# Patient Record
Sex: MALE | Race: WHITE | NOT HISPANIC OR LATINO | Employment: FULL TIME | URBAN - NONMETROPOLITAN AREA
[De-identification: names, ages, dates, MRNs, and addresses within clinical notes are randomized per-mention and may not be internally consistent; named-entity substitution may affect disease eponyms.]

---

## 2021-12-13 ENCOUNTER — OFFICE VISIT (OUTPATIENT)
Dept: FAMILY MEDICINE | Facility: OTHER | Age: 51
End: 2021-12-13
Attending: FAMILY MEDICINE
Payer: COMMERCIAL

## 2021-12-13 VITALS
DIASTOLIC BLOOD PRESSURE: 86 MMHG | WEIGHT: 183 LBS | HEART RATE: 64 BPM | TEMPERATURE: 96.4 F | RESPIRATION RATE: 20 BRPM | OXYGEN SATURATION: 98 % | SYSTOLIC BLOOD PRESSURE: 138 MMHG | BODY MASS INDEX: 29.41 KG/M2 | HEIGHT: 66 IN

## 2021-12-13 DIAGNOSIS — I10 BENIGN ESSENTIAL HYPERTENSION: Primary | ICD-10-CM

## 2021-12-13 DIAGNOSIS — Z23 NEED FOR PROPHYLACTIC VACCINATION AND INOCULATION AGAINST INFLUENZA: ICD-10-CM

## 2021-12-13 DIAGNOSIS — J45.20 MILD INTERMITTENT ASTHMA WITHOUT COMPLICATION: ICD-10-CM

## 2021-12-13 DIAGNOSIS — F41.9 ANXIETY: ICD-10-CM

## 2021-12-13 DIAGNOSIS — J30.89 NON-SEASONAL ALLERGIC RHINITIS, UNSPECIFIED TRIGGER: ICD-10-CM

## 2021-12-13 LAB
ANION GAP SERPL CALCULATED.3IONS-SCNC: 7 MMOL/L (ref 3–14)
BUN SERPL-MCNC: 16 MG/DL (ref 7–25)
CALCIUM SERPL-MCNC: 9.3 MG/DL (ref 8.6–10.3)
CHLORIDE BLD-SCNC: 106 MMOL/L (ref 98–107)
CO2 SERPL-SCNC: 25 MMOL/L (ref 21–31)
CREAT SERPL-MCNC: 1.12 MG/DL (ref 0.7–1.3)
ERYTHROCYTE [DISTWIDTH] IN BLOOD BY AUTOMATED COUNT: 13.3 % (ref 10–15)
GFR SERPL CREATININE-BSD FRML MDRD: 76 ML/MIN/1.73M2
GLUCOSE BLD-MCNC: 91 MG/DL (ref 70–105)
HCT VFR BLD AUTO: 43.7 % (ref 40–53)
HGB BLD-MCNC: 14.7 G/DL (ref 13.3–17.7)
MCH RBC QN AUTO: 29.9 PG (ref 26.5–33)
MCHC RBC AUTO-ENTMCNC: 33.6 G/DL (ref 31.5–36.5)
MCV RBC AUTO: 89 FL (ref 78–100)
PLATELET # BLD AUTO: 247 10E3/UL (ref 150–450)
POTASSIUM BLD-SCNC: 3.9 MMOL/L (ref 3.5–5.1)
RBC # BLD AUTO: 4.91 10E6/UL (ref 4.4–5.9)
SODIUM SERPL-SCNC: 138 MMOL/L (ref 134–144)
WBC # BLD AUTO: 6.1 10E3/UL (ref 4–11)

## 2021-12-13 PROCEDURE — 90682 RIV4 VACC RECOMBINANT DNA IM: CPT | Performed by: FAMILY MEDICINE

## 2021-12-13 PROCEDURE — 80048 BASIC METABOLIC PNL TOTAL CA: CPT | Mod: ZL | Performed by: FAMILY MEDICINE

## 2021-12-13 PROCEDURE — 90471 IMMUNIZATION ADMIN: CPT | Performed by: FAMILY MEDICINE

## 2021-12-13 PROCEDURE — 36415 COLL VENOUS BLD VENIPUNCTURE: CPT | Mod: ZL | Performed by: FAMILY MEDICINE

## 2021-12-13 PROCEDURE — 99204 OFFICE O/P NEW MOD 45 MIN: CPT | Mod: 25 | Performed by: FAMILY MEDICINE

## 2021-12-13 PROCEDURE — 85027 COMPLETE CBC AUTOMATED: CPT | Mod: ZL | Performed by: FAMILY MEDICINE

## 2021-12-13 RX ORDER — FLUTICASONE PROPIONATE 50 MCG
1 SPRAY, SUSPENSION (ML) NASAL DAILY
Qty: 16 G | Refills: 11 | Status: SHIPPED | OUTPATIENT
Start: 2021-12-13 | End: 2022-11-15

## 2021-12-13 RX ORDER — ALBUTEROL SULFATE 90 UG/1
2 AEROSOL, METERED RESPIRATORY (INHALATION) EVERY 6 HOURS PRN
Qty: 18 G | Refills: 11 | Status: SHIPPED | OUTPATIENT
Start: 2021-12-13 | End: 2022-11-15

## 2021-12-13 RX ORDER — CITALOPRAM HYDROBROMIDE 20 MG/1
20 TABLET ORAL DAILY
Qty: 90 TABLET | Refills: 4 | Status: SHIPPED | OUTPATIENT
Start: 2021-12-13 | End: 2022-11-15

## 2021-12-13 RX ORDER — LISINOPRIL 40 MG/1
40 TABLET ORAL DAILY
Qty: 90 TABLET | Refills: 4 | Status: SHIPPED | OUTPATIENT
Start: 2021-12-13 | End: 2022-11-15

## 2021-12-13 RX ORDER — MONTELUKAST SODIUM 10 MG/1
10 TABLET ORAL AT BEDTIME
Qty: 90 TABLET | Refills: 4 | Status: SHIPPED | OUTPATIENT
Start: 2021-12-13 | End: 2022-11-15

## 2021-12-13 RX ORDER — MELOXICAM 15 MG/1
15 TABLET ORAL DAILY
Qty: 90 TABLET | Refills: 4 | Status: CANCELLED | OUTPATIENT
Start: 2021-12-13

## 2021-12-13 RX ORDER — TRAZODONE HYDROCHLORIDE 50 MG/1
50 TABLET, FILM COATED ORAL AT BEDTIME
Qty: 90 TABLET | Refills: 4 | Status: SHIPPED | OUTPATIENT
Start: 2021-12-13 | End: 2022-11-15

## 2021-12-13 ASSESSMENT — ANXIETY QUESTIONNAIRES
6. BECOMING EASILY ANNOYED OR IRRITABLE: NOT AT ALL
2. NOT BEING ABLE TO STOP OR CONTROL WORRYING: NOT AT ALL
GAD7 TOTAL SCORE: 0
7. FEELING AFRAID AS IF SOMETHING AWFUL MIGHT HAPPEN: NOT AT ALL
5. BEING SO RESTLESS THAT IT IS HARD TO SIT STILL: NOT AT ALL
3. WORRYING TOO MUCH ABOUT DIFFERENT THINGS: NOT AT ALL
1. FEELING NERVOUS, ANXIOUS, OR ON EDGE: NOT AT ALL
IF YOU CHECKED OFF ANY PROBLEMS ON THIS QUESTIONNAIRE, HOW DIFFICULT HAVE THESE PROBLEMS MADE IT FOR YOU TO DO YOUR WORK, TAKE CARE OF THINGS AT HOME, OR GET ALONG WITH OTHER PEOPLE: NOT DIFFICULT AT ALL

## 2021-12-13 ASSESSMENT — PATIENT HEALTH QUESTIONNAIRE - PHQ9
5. POOR APPETITE OR OVEREATING: NOT AT ALL
SUM OF ALL RESPONSES TO PHQ QUESTIONS 1-9: 0

## 2021-12-13 ASSESSMENT — PAIN SCALES - GENERAL: PAINLEVEL: NO PAIN (0)

## 2021-12-13 ASSESSMENT — MIFFLIN-ST. JEOR: SCORE: 1628.86

## 2021-12-13 NOTE — NURSING NOTE
"Patient presents to the clinic for medication management.    FOOD SECURITY SCREENING QUESTIONS:    The next two questions are to help us understand your food security.  If you are feeling you need any assistance in this area, we have resources available to support you today.    Hunger Vital Signs:  Within the past 12 months we worried whether our food would run out before we got money to buy more. Never  Within the past 12 months the food we bought just didn't last and we didn't have money to get more. Never    Advance Care Directive on file? no  Advance Care Directive provided to patient? Declined.    Chief Complaint   Patient presents with     Recheck Medication       Initial BP (!) 148/72 (BP Location: Right arm, Patient Position: Sitting, Cuff Size: Adult Large)   Pulse 64   Temp (!) 96.4  F (35.8  C) (Tympanic)   Resp 20   Ht 1.67 m (5' 5.75\")   Wt 83 kg (183 lb)   SpO2 98%   BMI 29.76 kg/m   Estimated body mass index is 29.76 kg/m  as calculated from the following:    Height as of this encounter: 1.67 m (5' 5.75\").    Weight as of this encounter: 83 kg (183 lb).  Medication Reconciliation: complete        Erika Dickson LPN       "

## 2021-12-13 NOTE — PROGRESS NOTES
"Nursing Notes:   Erika Dickson LPN  12/13/2021  8:46 AM  Sign at exiting of workspace  Patient presents to the clinic for medication management.    FOOD SECURITY SCREENING QUESTIONS:    The next two questions are to help us understand your food security.  If you are feeling you need any assistance in this area, we have resources available to support you today.    Hunger Vital Signs:  Within the past 12 months we worried whether our food would run out before we got money to buy more. Never  Within the past 12 months the food we bought just didn't last and we didn't have money to get more. Never    Advance Care Directive on file? no  Advance Care Directive provided to patient? Declined.    Chief Complaint   Patient presents with     Recheck Medication       Initial BP (!) 148/72 (BP Location: Right arm, Patient Position: Sitting, Cuff Size: Adult Large)   Pulse 64   Temp (!) 96.4  F (35.8  C) (Tympanic)   Resp 20   Ht 1.67 m (5' 5.75\")   Wt 83 kg (183 lb)   SpO2 98%   BMI 29.76 kg/m   Estimated body mass index is 29.76 kg/m  as calculated from the following:    Height as of this encounter: 1.67 m (5' 5.75\").    Weight as of this encounter: 83 kg (183 lb).  Medication Reconciliation: complete        Erika Dickson LPN            Assessment & Plan       ICD-10-CM    1. Benign essential hypertension  I10 lisinopril (ZESTRIL) 40 MG tablet     Basic Metabolic Panel     CBC W PLT No Diff     CBC W PLT No Diff     Basic Metabolic Panel   2. Anxiety  F41.9 citalopram (CELEXA) 20 MG tablet     traZODone (DESYREL) 50 MG tablet   3. Mild intermittent asthma without complication  J45.20 montelukast (SINGULAIR) 10 MG tablet     albuterol (PROAIR HFA/PROVENTIL HFA/VENTOLIN HFA) 108 (90 Base) MCG/ACT inhaler   4. Non-seasonal allergic rhinitis, unspecified trigger  J30.89 montelukast (SINGULAIR) 10 MG tablet     fluticasone (FLONASE) 50 MCG/ACT nasal spray   5. Need for prophylactic vaccination and inoculation against " influenza  Z23 INFLUENZA QUAD, RECOMBINANT, P-FREE (RIV4) (FLUBLOK)     Blood pressure is controlled. BMP and CBC WNL. Refilled lisinopril    Refilled citalopram and as needed trazodone, no changes    Refilled montelukast and albuterol. May need ICS again if asthma worsens back in [place]    Refilled fluticasone for allergies    Influenza vaccine provided.   History of COVID in Oct 2020, 2 vaccinations since, he should be covered. No booster at this time.    Follow up 1 year    Enrique Jaramillo MD     [facility]      Answers for HPI/ROS submitted by the patient on 12/13/2021  Do you have a cough?: No  Are you experiencing any wheezing in your chest?: No  Do you have any shortness of breath?: No  Use of rescue inhaler:: never  Taking Asthma medication as prescribed:: 0  Asthma triggers:: unaware of any triggers  Have you had any Emergency Room visits, Urgent Care visits, or Hospital Admissions since your last office visit?: No    SUBJECTIVE:  51 year old male presents to establish care and follow up on hypertension, asthma, allergies, anxiety.    Lived in [place] for 4 years. Moved back to [place] a few months ago. Works for [employer].    Blood pressure controlled on lisinopril    Montelukast helps asthma and allergies. No albuterol since last year. He's used Flovent in the past, but not for a few years. No significant flares.     On fluticasone regularly for allergies at once daily, twice daily when allergies are worse    Uses trazodone as needed to help with sleep. Used more during the move due to increased stressors. On citalopram for a few years for anxiety. Current dose seems effective.    REVIEW OF SYSTEMS:    Constitutional: negative  Respiratory: negative  Cardiovascular: negative  Gastrointestinal: negative  Musculoskeletal: negative  Neurological: negative    Past Medical History:   Diagnosis Date     Allergic rhinitis 2/14/2013     Anxiety 12/13/2021     Asthma 9/1/2006     Benign essential  "hypertension 12/13/2021     History of COVID-19 10/2020         No current outpatient medications on file.     No Known Allergies    OBJECTIVE:  BP (!) 148/72 (BP Location: Right arm, Patient Position: Sitting, Cuff Size: Adult Large)   Pulse 64   Temp (!) 96.4  F (35.8  C) (Tympanic)   Resp 20   Ht 1.67 m (5' 5.75\")   Wt 83 kg (183 lb)   SpO2 98%   BMI 29.76 kg/m      EXAM:  General Appearance: Alert. No acute distress  Chest/Respiratory Exam: Clear to auscultation bilaterally  Cardiovascular Exam: Regular rate and rhythm. S1, S2, no murmur, gallop, or rubs.  Extremities: 2+ pedal pulses.  No lower extremity edema.  Psychiatric: Normal affect and mentation        Results for orders placed or performed in visit on 12/13/21   CBC W PLT No Diff     Status: Normal   Result Value Ref Range    WBC Count 6.1 4.0 - 11.0 10e3/uL    RBC Count 4.91 4.40 - 5.90 10e6/uL    Hemoglobin 14.7 13.3 - 17.7 g/dL    Hematocrit 43.7 40.0 - 53.0 %    MCV 89 78 - 100 fL    MCH 29.9 26.5 - 33.0 pg    MCHC 33.6 31.5 - 36.5 g/dL    RDW 13.3 10.0 - 15.0 %    Platelet Count 247 150 - 450 10e3/uL   Basic Metabolic Panel     Status: Normal   Result Value Ref Range    Sodium 138 134 - 144 mmol/L    Potassium 3.9 3.5 - 5.1 mmol/L    Chloride 106 98 - 107 mmol/L    Carbon Dioxide (CO2) 25 21 - 31 mmol/L    Anion Gap 7 3 - 14 mmol/L    Urea Nitrogen 16 7 - 25 mg/dL    Creatinine 1.12 0.70 - 1.30 mg/dL    Calcium 9.3 8.6 - 10.3 mg/dL    Glucose 91 70 - 105 mg/dL    GFR Estimate 76 >60 mL/min/1.73m2              "

## 2021-12-13 NOTE — NURSING NOTE
Immunization Documentation    Prior to Immunization administration, verified patients identity using patient's name and date of birth. Please see IMMUNIZATIONS  and order for additional information.  Patient / Parent instructed to remain in clinic for 15 minutes and report any adverse reaction to staff immediately.    Was entire vial of medication used? Yes  Vial/Syringe: Jazmyn Dickson LPN  12/13/2021   9:26 AM

## 2021-12-14 ASSESSMENT — ASTHMA QUESTIONNAIRES: ACT_TOTALSCORE: 25

## 2021-12-14 ASSESSMENT — ANXIETY QUESTIONNAIRES: GAD7 TOTAL SCORE: 0

## 2022-02-06 ENCOUNTER — HEALTH MAINTENANCE LETTER (OUTPATIENT)
Age: 52
End: 2022-02-06

## 2022-04-18 ENCOUNTER — TRANSFERRED RECORDS (OUTPATIENT)
Dept: HEALTH INFORMATION MANAGEMENT | Facility: CLINIC | Age: 52
End: 2022-04-18

## 2022-04-18 LAB — HEMOCCULT STL QL IA: NEGATIVE

## 2022-06-21 ENCOUNTER — E-VISIT (OUTPATIENT)
Dept: URGENT CARE | Facility: CLINIC | Age: 52
End: 2022-06-21
Payer: COMMERCIAL

## 2022-06-21 DIAGNOSIS — R21 RASH AND NONSPECIFIC SKIN ERUPTION: Primary | ICD-10-CM

## 2022-06-21 PROCEDURE — 99207 PR NON-BILLABLE SERV PER CHARTING: CPT | Performed by: EMERGENCY MEDICINE

## 2022-06-21 NOTE — PATIENT INSTRUCTIONS
Dear Scott Villegas,    Please come into urgent care today.  It looks like an infection but needs to be more closely examined.        We are sorry you are not feeling well. Based on the responses you provided, it is recommended that you be seen in-person in urgent care so we can better evaluate your symptoms. Please click here to find the nearest urgent care location to you.   You will not be charged for this Visit. Thank you for trusting us with your care.    Jorge Alexandra MD

## 2022-10-03 ENCOUNTER — MYC MEDICAL ADVICE (OUTPATIENT)
Dept: FAMILY MEDICINE | Facility: OTHER | Age: 52
End: 2022-10-03

## 2022-10-03 ENCOUNTER — HEALTH MAINTENANCE LETTER (OUTPATIENT)
Age: 52
End: 2022-10-03

## 2022-11-15 ENCOUNTER — OFFICE VISIT (OUTPATIENT)
Dept: FAMILY MEDICINE | Facility: OTHER | Age: 52
End: 2022-11-15
Attending: FAMILY MEDICINE
Payer: COMMERCIAL

## 2022-11-15 VITALS
TEMPERATURE: 98 F | SYSTOLIC BLOOD PRESSURE: 126 MMHG | BODY MASS INDEX: 30.82 KG/M2 | HEIGHT: 65 IN | OXYGEN SATURATION: 97 % | HEART RATE: 84 BPM | DIASTOLIC BLOOD PRESSURE: 72 MMHG | RESPIRATION RATE: 16 BRPM | WEIGHT: 185 LBS

## 2022-11-15 DIAGNOSIS — I10 BENIGN ESSENTIAL HYPERTENSION: ICD-10-CM

## 2022-11-15 DIAGNOSIS — Z00.00 ANNUAL PHYSICAL EXAM: Primary | ICD-10-CM

## 2022-11-15 DIAGNOSIS — J30.89 NON-SEASONAL ALLERGIC RHINITIS, UNSPECIFIED TRIGGER: ICD-10-CM

## 2022-11-15 DIAGNOSIS — J45.20 MILD INTERMITTENT ASTHMA WITHOUT COMPLICATION: ICD-10-CM

## 2022-11-15 DIAGNOSIS — F41.9 ANXIETY: ICD-10-CM

## 2022-11-15 DIAGNOSIS — Z23 NEEDS FLU SHOT: ICD-10-CM

## 2022-11-15 LAB
ALBUMIN SERPL BCG-MCNC: 4.3 G/DL (ref 3.5–5.2)
ALP SERPL-CCNC: 96 U/L (ref 40–129)
ALT SERPL W P-5'-P-CCNC: 29 U/L (ref 10–50)
ANION GAP SERPL CALCULATED.3IONS-SCNC: 11 MMOL/L (ref 7–15)
AST SERPL W P-5'-P-CCNC: 25 U/L (ref 10–50)
BILIRUB SERPL-MCNC: 0.2 MG/DL
BUN SERPL-MCNC: 17.6 MG/DL (ref 6–20)
CALCIUM SERPL-MCNC: 9.1 MG/DL (ref 8.6–10)
CHLORIDE SERPL-SCNC: 105 MMOL/L (ref 98–107)
CHOLEST SERPL-MCNC: 200 MG/DL
CREAT SERPL-MCNC: 1.03 MG/DL (ref 0.67–1.17)
DEPRECATED HCO3 PLAS-SCNC: 25 MMOL/L (ref 22–29)
GFR SERPL CREATININE-BSD FRML MDRD: 88 ML/MIN/1.73M2
GLUCOSE SERPL-MCNC: 91 MG/DL (ref 70–99)
HBA1C MFR BLD: 5.3 % (ref 4–6.2)
HDLC SERPL-MCNC: 33 MG/DL
LDLC SERPL CALC-MCNC: ABNORMAL MG/DL
NONHDLC SERPL-MCNC: 167 MG/DL
POTASSIUM SERPL-SCNC: 3.9 MMOL/L (ref 3.4–5.3)
PROT SERPL-MCNC: 7.2 G/DL (ref 6.4–8.3)
SODIUM SERPL-SCNC: 141 MMOL/L (ref 136–145)
TRIGL SERPL-MCNC: 485 MG/DL

## 2022-11-15 PROCEDURE — 80061 LIPID PANEL: CPT | Mod: ZL | Performed by: FAMILY MEDICINE

## 2022-11-15 PROCEDURE — 99396 PREV VISIT EST AGE 40-64: CPT | Mod: 25 | Performed by: FAMILY MEDICINE

## 2022-11-15 PROCEDURE — 36415 COLL VENOUS BLD VENIPUNCTURE: CPT | Mod: ZL | Performed by: FAMILY MEDICINE

## 2022-11-15 PROCEDURE — 90682 RIV4 VACC RECOMBINANT DNA IM: CPT | Performed by: FAMILY MEDICINE

## 2022-11-15 PROCEDURE — 83036 HEMOGLOBIN GLYCOSYLATED A1C: CPT | Mod: ZL | Performed by: FAMILY MEDICINE

## 2022-11-15 PROCEDURE — 80053 COMPREHEN METABOLIC PANEL: CPT | Mod: ZL | Performed by: FAMILY MEDICINE

## 2022-11-15 PROCEDURE — 90471 IMMUNIZATION ADMIN: CPT | Performed by: FAMILY MEDICINE

## 2022-11-15 PROCEDURE — 90677 PCV20 VACCINE IM: CPT | Performed by: FAMILY MEDICINE

## 2022-11-15 PROCEDURE — 90472 IMMUNIZATION ADMIN EACH ADD: CPT | Performed by: FAMILY MEDICINE

## 2022-11-15 RX ORDER — FLUTICASONE PROPIONATE 50 MCG
1 SPRAY, SUSPENSION (ML) NASAL DAILY
Qty: 16 G | Refills: 11 | Status: SHIPPED | OUTPATIENT
Start: 2022-11-15 | End: 2024-01-18

## 2022-11-15 RX ORDER — TRAZODONE HYDROCHLORIDE 50 MG/1
50 TABLET, FILM COATED ORAL AT BEDTIME
Qty: 90 TABLET | Refills: 4 | Status: SHIPPED | OUTPATIENT
Start: 2022-11-15 | End: 2024-01-18

## 2022-11-15 RX ORDER — MONTELUKAST SODIUM 10 MG/1
10 TABLET ORAL AT BEDTIME
Qty: 90 TABLET | Refills: 4 | Status: SHIPPED | OUTPATIENT
Start: 2022-11-15 | End: 2024-01-18

## 2022-11-15 RX ORDER — CITALOPRAM HYDROBROMIDE 20 MG/1
20 TABLET ORAL DAILY
Qty: 90 TABLET | Refills: 4 | Status: SHIPPED | OUTPATIENT
Start: 2022-11-15 | End: 2024-01-18

## 2022-11-15 RX ORDER — LISINOPRIL 40 MG/1
40 TABLET ORAL DAILY
Qty: 90 TABLET | Refills: 4 | Status: SHIPPED | OUTPATIENT
Start: 2022-11-15 | End: 2024-01-18

## 2022-11-15 RX ORDER — ALBUTEROL SULFATE 90 UG/1
2 AEROSOL, METERED RESPIRATORY (INHALATION) EVERY 6 HOURS PRN
Qty: 18 G | Refills: 11 | Status: SHIPPED | OUTPATIENT
Start: 2022-11-15 | End: 2024-01-18

## 2022-11-15 ASSESSMENT — ASTHMA QUESTIONNAIRES
QUESTION_2 LAST FOUR WEEKS HOW OFTEN HAVE YOU HAD SHORTNESS OF BREATH: NOT AT ALL
QUESTION_4 LAST FOUR WEEKS HOW OFTEN HAVE YOU USED YOUR RESCUE INHALER OR NEBULIZER MEDICATION (SUCH AS ALBUTEROL): NOT AT ALL
ACT_TOTALSCORE: 24
QUESTION_1 LAST FOUR WEEKS HOW MUCH OF THE TIME DID YOUR ASTHMA KEEP YOU FROM GETTING AS MUCH DONE AT WORK, SCHOOL OR AT HOME: NONE OF THE TIME
QUESTION_3 LAST FOUR WEEKS HOW OFTEN DID YOUR ASTHMA SYMPTOMS (WHEEZING, COUGHING, SHORTNESS OF BREATH, CHEST TIGHTNESS OR PAIN) WAKE YOU UP AT NIGHT OR EARLIER THAN USUAL IN THE MORNING: NOT AT ALL
QUESTION_5 LAST FOUR WEEKS HOW WOULD YOU RATE YOUR ASTHMA CONTROL: WELL CONTROLLED
ACT_TOTALSCORE: 24

## 2022-11-15 ASSESSMENT — ENCOUNTER SYMPTOMS
DIARRHEA: 0
DIZZINESS: 0
FREQUENCY: 0
HEMATURIA: 0
FEVER: 0
JOINT SWELLING: 0
ABDOMINAL PAIN: 0
WEAKNESS: 0
HEMATOCHEZIA: 0
ARTHRALGIAS: 0
HEARTBURN: 0
CONSTIPATION: 0
COUGH: 0
EYE PAIN: 0
MYALGIAS: 0
NERVOUS/ANXIOUS: 0
CHILLS: 0
PARESTHESIAS: 0
NAUSEA: 0
PALPITATIONS: 0
HEADACHES: 0
SHORTNESS OF BREATH: 0
DYSURIA: 0
SORE THROAT: 0

## 2022-11-15 ASSESSMENT — ANXIETY QUESTIONNAIRES
4. TROUBLE RELAXING: NOT AT ALL
IF YOU CHECKED OFF ANY PROBLEMS ON THIS QUESTIONNAIRE, HOW DIFFICULT HAVE THESE PROBLEMS MADE IT FOR YOU TO DO YOUR WORK, TAKE CARE OF THINGS AT HOME, OR GET ALONG WITH OTHER PEOPLE: NOT DIFFICULT AT ALL
8. IF YOU CHECKED OFF ANY PROBLEMS, HOW DIFFICULT HAVE THESE MADE IT FOR YOU TO DO YOUR WORK, TAKE CARE OF THINGS AT HOME, OR GET ALONG WITH OTHER PEOPLE?: NOT DIFFICULT AT ALL
2. NOT BEING ABLE TO STOP OR CONTROL WORRYING: NOT AT ALL
7. FEELING AFRAID AS IF SOMETHING AWFUL MIGHT HAPPEN: NOT AT ALL
GAD7 TOTAL SCORE: 1
1. FEELING NERVOUS, ANXIOUS, OR ON EDGE: NOT AT ALL
5. BEING SO RESTLESS THAT IT IS HARD TO SIT STILL: NOT AT ALL
3. WORRYING TOO MUCH ABOUT DIFFERENT THINGS: NOT AT ALL
6. BECOMING EASILY ANNOYED OR IRRITABLE: SEVERAL DAYS
7. FEELING AFRAID AS IF SOMETHING AWFUL MIGHT HAPPEN: NOT AT ALL
GAD7 TOTAL SCORE: 1
GAD7 TOTAL SCORE: 1

## 2022-11-15 ASSESSMENT — PATIENT HEALTH QUESTIONNAIRE - PHQ9
SUM OF ALL RESPONSES TO PHQ QUESTIONS 1-9: 0
SUM OF ALL RESPONSES TO PHQ QUESTIONS 1-9: 0

## 2022-11-15 ASSESSMENT — PAIN SCALES - GENERAL: PAINLEVEL: NO PAIN (0)

## 2022-11-15 NOTE — LETTER
My Asthma Action Plan    Name: Scott Villegas   YOB: 1970  Date: 11/15/2022   My doctor: Enrique Jaramillo MD   My clinic: [facility]        My Rescue Medicine:   Albuterol inhaler (Proair/Ventolin/Proventil HFA)  2-4 puffs EVERY 4 HOURS as needed. Use a spacer if recommended by your provider.   My Asthma Severity:   Intermittent / Exercise Induced  Know your asthma triggers: smoke, pollens, humidity and wine             GREEN ZONE   Good Control    I feel good    No cough or wheeze    Can work, sleep and play without asthma symptoms       Take your asthma control medicine every day.     1. If exercise triggers your asthma, take your rescue medication    15 minutes before exercise or sports, and    During exercise if you have asthma symptoms  2. Spacer to use with inhaler: If you have a spacer, make sure to use it with your inhaler             YELLOW ZONE Getting Worse  I have ANY of these:    I do not feel good    Cough or wheeze    Chest feels tight    Wake up at night   1. Keep taking your Green Zone medications  2. Start taking your rescue medicine:    every 20 minutes for up to 1 hour. Then every 4 hours for 24-48 hours.  3. If you stay in the Yellow Zone for more than 12-24 hours, contact your doctor.  4. If you do not return to the Green Zone in 12-24 hours or you get worse, start taking your oral steroid medicine if prescribed by your provider.           RED ZONE Medical Alert - Get Help  I have ANY of these:    I feel awful    Medicine is not helping    Breathing getting harder    Trouble walking or talking    Nose opens wide to breathe       1. Take your rescue medicine NOW  2. If your provider has prescribed an oral steroid medicine, start taking it NOW  3. Call your doctor NOW  4. If you are still in the Red Zone after 20 minutes and you have not reached your doctor:    Take your rescue medicine again and    Call 911 or go to the emergency room right away    See your  regular doctor within 2 weeks of an Emergency Room or Urgent Care visit for follow-up treatment.          Annual Reminders:  Meet with Asthma Educator,  Flu Shot in the Fall, consider Pneumonia Vaccination for patients with asthma (aged 19 and older).    Pharmacy: [id number] ([place]) - [place] [id number]    Electronically signed by Enrique Jaramillo MD   Date: 11/15/22                    Asthma Triggers  How To Control Things That Make Your Asthma Worse    Triggers are things that make your asthma worse.  Look at the list below to help you find your triggers and   what you can do about them. You can help prevent asthma flare-ups by staying away from your triggers.      Trigger                                                          What you can do   Cigarette Smoke  Tobacco smoke can make asthma worse. Do not allow smoking in your home, car or around you.  Be sure no one smokes at a child s day care or school.  If you smoke, ask your health care provider for ways to help you quit.  Ask family members to quit too.  Ask your health care provider for a referral to Quit Plan to help you quit smoking, or call 7-138-182-PLAN.     Colds, Flu, Bronchitis  These are common triggers of asthma. Wash your hands often.  Don t touch your eyes, nose or mouth.  Get a flu shot every year.     Dust Mites  These are tiny bugs that live in cloth or carpet. They are too small to see. Wash sheets and blankets in hot water every week.   Encase pillows and mattress in dust mite proof covers.  Avoid having carpet if you can. If you have carpet, vacuum weekly.   Use a dust mask and HEPA vacuum.   Pollen and Outdoor Mold  Some people are allergic to trees, grass, or weed pollen, or molds. Try to keep your windows closed.  Limit time out doors when pollen count is high.   Ask you health care provider about taking medicine during allergy season.     Animal Dander  Some people are allergic to skin flakes,  urine or saliva from pets with fur or feathers. Keep pets with fur or feathers out of your home.    If you can t keep the pet outdoors, then keep the pet out of your bedroom.  Keep the bedroom door closed.  Keep pets off cloth furniture and away from stuffed toys.     Mice, Rats, and Cockroaches  Some people are allergic to the waste from these pests.   Cover food and garbage.  Clean up spills and food crumbs.  Store grease in the refrigerator.   Keep food out of the bedroom.   Indoor Mold  This can be a trigger if your home has high moisture. Fix leaking faucets, pipes, or other sources of water.   Clean moldy surfaces.  Dehumidify basement if it is damp and smelly.   Smoke, Strong Odors, and Sprays  These can reduce air quality. Stay away from strong odors and sprays, such as perfume, powder, hair spray, paints, smoke incense, paint, cleaning products, candles and new carpet.   Exercise or Sports  Some people with asthma have this trigger. Be active!  Ask your doctor about taking medicine before sports or exercise to prevent symptoms.    Warm up for 5-10 minutes before and after sports or exercise.     Other Triggers of Asthma  Cold air:  Cover your nose and mouth with a scarf.  Sometimes laughing or crying can be a trigger.  Some medicines and food can trigger asthma.

## 2022-11-15 NOTE — PATIENT INSTRUCTIONS
Pneumonia and flu vaccines  Shingles from the shot nurse  Consider COVID vaccine to reduce disease severity

## 2022-11-15 NOTE — NURSING NOTE
"Patient presents to the clinic for physical.    FOOD SECURITY SCREENING QUESTIONS:    The next two questions are to help us understand your food security.  If you are feeling you need any assistance in this area, we have resources available to support you today.    Hunger Vital Signs:  Within the past 12 months we worried whether our food would run out before we got money to buy more. Never  Within the past 12 months the food we bought just didn't last and we didn't have money to get more. Never    Advance Care Directive on file? no  Advance Care Directive provided to patient? Declined.    Chief Complaint   Patient presents with     Physical       Initial /72 (BP Location: Right arm, Patient Position: Sitting, Cuff Size: Adult Large)   Pulse 84   Temp 98  F (36.7  C) (Tympanic)   Resp 16   Ht 1.657 m (5' 5.25\")   Wt 83.9 kg (185 lb)   SpO2 97%   BMI 30.55 kg/m   Estimated body mass index is 30.55 kg/m  as calculated from the following:    Height as of this encounter: 1.657 m (5' 5.25\").    Weight as of this encounter: 83.9 kg (185 lb).  Medication Reconciliation: complete        Erika Dickson LPN       "

## 2022-11-15 NOTE — PROGRESS NOTES
3  SUBJECTIVE:   CC: Scott Villegas is an 52 year old male who presents for preventive health visit.       Patient has been advised of split billing requirements and indicates understanding: Yes  Healthy Habits:    Answers for HPI/ROS submitted by the patient on 11/15/2022  PHQ9 TOTAL SCORE: 0  DOUGLAS 7 TOTAL SCORE: 1  Frequency of exercise:: 2-3 days/week  Getting at least 3 servings of Calcium per day:: NO  Diet:: Regular (no restrictions)  Taking medications regularly:: Yes  Medication side effects:: None  Bi-annual eye exam:: NO  Dental care twice a year:: NO  Sleep apnea or symptoms of sleep apnea:: None  Additional concerns today:: No  Duration of exercise:: 15-30 minutes            Today's PHQ-2 Score:   PHQ-2 ( 1999 [employer]) 11/15/2022 12/13/2021   Q1: Little interest or pleasure in doing things 0 0   Q2: Feeling down, depressed or hopeless 0 0   PHQ-2 Score 0 0   Q1: Little interest or pleasure in doing things Not at all Not at all   Q2: Feeling down, depressed or hopeless Not at all Not at all   PHQ-2 Score 0 0       Abuse: Current or Past(Physical, Sexual or Emotional)- NA  Do you feel safe in your environment? Yes    Have you ever done Advance Care Planning? (For example, a Health Directive, POLST, or a discussion with a medical provider or your loved ones about your wishes): No, advance care planning information given to patient to review.  Advanced care planning was discussed at today's visit.    Social History     Tobacco Use     Smoking status: Never     Smokeless tobacco: Never   Substance Use Topics     Alcohol use: Yes     Comment: couple times per week     If you drink alcohol do you typically have >3 drinks per day or >7 drinks per week? No                      Last PSA: No results found for: PSA    Reviewed orders with patient. Reviewed health maintenance and updated orders accordingly - Yes  Patient Active Problem List   Diagnosis     Allergic rhinitis     Asthma     Brachial neuritis or  radiculitis     Contact dermatitis and other eczema due to other chemical products     Ganglion of joint     Neuralgia, neuritis, and radiculitis, unspecified     Olecranon bursitis     Stomatitis and mucositis     Thoracic or lumbosacral neuritis or radiculitis, unspecified     Anxiety     Benign essential hypertension     Past Surgical History:   Procedure Laterality Date     FECAL COLORECTAL CANCER SCREEN (FIT) (EXTERNAL RESULT)  04/18/2022    Negative       Social History     Tobacco Use     Smoking status: Never     Smokeless tobacco: Never   Substance Use Topics     Alcohol use: Yes     Comment: couple times per week     Family History   Problem Relation Age of Onset     Heart Defect Mother      Diabetes Father      Prostate Cancer No family hx of      Colon Cancer No family hx of          Current Outpatient Medications   Medication Sig Dispense Refill     albuterol (PROAIR HFA/PROVENTIL HFA/VENTOLIN HFA) 108 (90 Base) MCG/ACT inhaler Inhale 2 puffs into the lungs every 6 hours as needed for shortness of breath / dyspnea or wheezing 18 g 11     citalopram (CELEXA) 20 MG tablet Take 1 tablet (20 mg) by mouth daily 90 tablet 4     fluticasone (FLONASE) 50 MCG/ACT nasal spray Spray 1 spray into both nostrils daily 16 g 11     lisinopril (ZESTRIL) 40 MG tablet Take 1 tablet (40 mg) by mouth daily 90 tablet 4     montelukast (SINGULAIR) 10 MG tablet Take 1 tablet (10 mg) by mouth At Bedtime 90 tablet 4     traZODone (DESYREL) 50 MG tablet Take 1 tablet (50 mg) by mouth At Bedtime 90 tablet 4     No Known Allergies    Reviewed and updated as needed this visit by clinical staff   Tobacco  Allergies  Meds  Problems  Med Hx  Surg Hx  Fam Hx  Soc   Hx        Reviewed and updated as needed this visit by Provider   Tobacco  Allergies  Meds  Problems  Med Hx  Surg Hx  Fam Hx             ROS:  abdominal pain: No  Blood in stool: No  Blood in urine: No  chest pain: No  chills: No  congestion: No  constipation:  "No  cough: No  diarrhea: No  dizziness: No  ear pain: No  eye pain: No  nervous/anxious: No  fever: No  frequency: No  genital sores: No  headaches: No  hearing loss: No  heartburn: No  arthralgias: No  joint swelling: No  peripheral edema: No  mood changes: No  myalgias: No  nausea: No  dysuria: No  palpitations: No  Skin sensation changes: No  sore throat: No  urgency: No  rash: No  shortness of breath: No  visual disturbance: No  weakness: No  penile discharge: No      OBJECTIVE:   /72 (BP Location: Right arm, Patient Position: Sitting, Cuff Size: Adult Large)   Pulse 84   Temp 98  F (36.7  C) (Tympanic)   Resp 16   Ht 1.657 m (5' 5.25\")   Wt 83.9 kg (185 lb)   SpO2 97%   BMI 30.55 kg/m    EXAM:  General Appearance: Pleasant, alert, appropriate appearance for age. No acute distress  Eye Exam:  Normal external eye, conjunctiva, lids, cornea. RENE.  Ear Exam: Normal TM's bilaterally. Normal auditory canals and external ears.  OroPharynx Exam:  Dental hygiene adequate. Normal buccal mucosa. Normal pharynx.  Neck Exam:  Supple, no masses or nodes.  Thyroid Exam: No nodules or enlargement.  Chest/Respiratory Exam: Normal chest wall and respirations. Clear to auscultation.  Cardiovascular Exam: Regular rate and rhythm. S1, S2, no murmur, click, gallop, or rubs.  Gastrointestinal Exam: Soft, non-tender, no masses or organomegaly.  Musculoskeletal Exam: Back is straight and non-tender, full ROM of upper and lower extremities.  Foot Exam: Left and right foot: good pedal pulses.  Skin: no rash or abnormalities  Neurologic Exam: Nonfocal, symmetric DTRs, normal gross motor, tone coordination and no tremor.  Psychiatric Exam: Alert and oriented - appropriate affect.      Results for orders placed or performed in visit on 11/15/22   Hemoglobin A1c     Status: Normal   Result Value Ref Range    Hemoglobin A1C 5.3 4.0 - 6.2 %   Lipid Panel     Status: Abnormal   Result Value Ref Range    Cholesterol 200 (H) <200 " mg/dL    Triglycerides 485 (H) <150 mg/dL    Direct Measure HDL 33 (L) >=40 mg/dL    LDL Cholesterol Calculated      Non HDL Cholesterol 167 (H) <130 mg/dL    Narrative    Cholesterol  Desirable:  <200 mg/dL    Triglycerides  Normal:  Less than 150 mg/dL  Borderline High:  150-199 mg/dL  High:  200-499 mg/dL  Very High:  Greater than or equal to 500 mg/dL    Direct Measure HDL  Female:  Greater than or equal to 50 mg/dL   Male:  Greater than or equal to 40 mg/dL    LDL Cholesterol  Desirable:  <100mg/dL  Above Desirable:  100-129 mg/dL   Borderline High:  130-159 mg/dL   High:  160-189 mg/dL   Very High:  >= 190 mg/dL    Non HDL Cholesterol  Desirable:  130 mg/dL  Above Desirable:  130-159 mg/dL  Borderline High:  160-189 mg/dL  High:  190-219 mg/dL  Very High:  Greater than or equal to 220 mg/dL   Comprehensive Metabolic Panel     Status: Normal   Result Value Ref Range    Sodium 141 136 - 145 mmol/L    Potassium 3.9 3.4 - 5.3 mmol/L    Chloride 105 98 - 107 mmol/L    Carbon Dioxide (CO2) 25 22 - 29 mmol/L    Anion Gap 11 7 - 15 mmol/L    Urea Nitrogen 17.6 6.0 - 20.0 mg/dL    Creatinine 1.03 0.67 - 1.17 mg/dL    Calcium 9.1 8.6 - 10.0 mg/dL    Glucose 91 70 - 99 mg/dL    Alkaline Phosphatase 96 40 - 129 U/L    AST 25 10 - 50 U/L    ALT 29 10 - 50 U/L    Protein Total 7.2 6.4 - 8.3 g/dL    Albumin 4.3 3.5 - 5.2 g/dL    Bilirubin Total 0.2 <=1.2 mg/dL    GFR Estimate 88 >60 mL/min/1.73m2        ASSESSMENT/PLAN:       ICD-10-CM    1. Annual physical exam  Z00.00 INFLUENZA QUAD, RECOMBINANT, P-FREE (RIV4) (FLUBLOK)     PNEUMOCOCCAL 20 VALENT CONJUGATE (PREVNAR 20)     Lipid Panel     Hemoglobin A1c     Hemoglobin A1c     Lipid Panel      2. Benign essential hypertension  I10 lisinopril (ZESTRIL) 40 MG tablet     Comprehensive Metabolic Panel     Comprehensive Metabolic Panel      3. Anxiety  F41.9 traZODone (DESYREL) 50 MG tablet     citalopram (CELEXA) 20 MG tablet      4. Mild intermittent asthma without  complication  J45.20 montelukast (SINGULAIR) 10 MG tablet     albuterol (PROAIR HFA/PROVENTIL HFA/VENTOLIN HFA) 108 (90 Base) MCG/ACT inhaler      5. Non-seasonal allergic rhinitis, unspecified trigger  J30.89 montelukast (SINGULAIR) 10 MG tablet     fluticasone (FLONASE) 50 MCG/ACT nasal spray      6. Needs flu shot  Z23             Patient has been advised of split billing requirements and indicates understanding: Yes     Blood pressure controlled.  BMP normal.  Refilled lisinopril    Anxiety controlled with current citalopram dose.  Using trazodone to help with sleep.  Discussed trying to wean off trazodone as there is no proven long-term benefit for insomnia treatment.    Asthma and allergies controlled with montelukast.  Allergic rhinitis controlled with fluticasone.  Rarely using albuterol, mainly in the spring with worsened allergies.  Refilled medications      COUNSELING:  Reviewed preventive health counseling, as reflected in patient instructions       Regular exercise       Healthy diet/nutrition       Vision screening       Hearing screening       Immunizations    Vaccinated for: Influenza     Pneumococcal vaccination provided due to asthma    Shingrix from shot nurse if approved by insurance         Colorectal cancer screening - completed FIT testing through insurance        Statin not indicated.  HDL is lower than last year.  Triglycerides and non-HDL cholesterol are higher.  Encouraged weight loss and regular exercise.  Recheck next year       The 10-year ASCVD risk score (Martha JONES, et al., 2019) is: 6.6%    Values used to calculate the score:      Age: 52 years      Sex: Male      Is Non- : No      Diabetic: No      Tobacco smoker: No      Systolic Blood Pressure: 126 mmHg      Is BP treated: Yes      HDL Cholesterol: 33 mg/dL      Total Cholesterol: 200 mg/dL    Estimated body mass index is 30.55 kg/m  as calculated from the following:    Height as of this encounter: 1.657 m  "(5' 5.25\").    Weight as of this encounter: 83.9 kg (185 lb).    Weight management plan: Discussed healthy diet and exercise guidelines    He reports that he has never smoked. He has never used smokeless tobacco.      Counseling Resources:  ATP IV Guidelines  Pooled Cohorts Equation Calculator  FRAX Risk Assessment  ICSI Preventive Guidelines  Dietary Guidelines for Americans, 2010  USDA's MyPlate  ASA Prophylaxis  Lung CA Screening    Enrique Jaramillo MD  [facility]  "

## 2023-12-31 ENCOUNTER — HEALTH MAINTENANCE LETTER (OUTPATIENT)
Age: 53
End: 2023-12-31

## 2024-01-13 ENCOUNTER — MYC REFILL (OUTPATIENT)
Dept: FAMILY MEDICINE | Facility: OTHER | Age: 54
End: 2024-01-13
Payer: COMMERCIAL

## 2024-01-13 DIAGNOSIS — J30.89 NON-SEASONAL ALLERGIC RHINITIS, UNSPECIFIED TRIGGER: ICD-10-CM

## 2024-01-13 DIAGNOSIS — I10 BENIGN ESSENTIAL HYPERTENSION: ICD-10-CM

## 2024-01-13 DIAGNOSIS — F41.9 ANXIETY: ICD-10-CM

## 2024-01-13 DIAGNOSIS — J45.20 MILD INTERMITTENT ASTHMA WITHOUT COMPLICATION: ICD-10-CM

## 2024-01-15 NOTE — TELEPHONE ENCOUNTER
Requested Prescriptions   Pending Prescriptions Disp Refills    traZODone (DESYREL) 50 MG tablet 90 tablet 4     Sig: Take 1 tablet (50 mg) by mouth at bedtime   Last Prescription Date:   11/15/22  Last Fill Qty/Refills:         90, R-4      Serotonin Modulators Failed - 1/15/2024  3:07 PM        Failed - Recent (12 mo) or future (30 days) visit within the authorizing provider's specialty       montelukast (SINGULAIR) 10 MG tablet 90 tablet 4     Sig: Take 1 tablet (10 mg) by mouth at bedtime   Last Prescription Date:   11/15/22  Last Fill Qty/Refills:         90, R-4      Leukotriene Inhibitors Protocol Failed - 1/15/2024  3:07 PM        Failed - Asthma control assessment score within normal limits in last 6 months     Please review ACT score.         Failed - Recent (6 mo) or future (30 days) visit within the authorizing provider's specialty       lisinopril (ZESTRIL) 40 MG tablet 90 tablet 4     Sig: Take 1 tablet (40 mg) by mouth daily   Last Prescription Date:   11/15/22  Last Fill Qty/Refills:         90, R-4      ACE Inhibitors (Including Combos) Protocol Failed - 1/15/2024  3:07 PM        Failed - Blood pressure under 140/90 in past 12 months     BP Readings from Last 3 Encounters:   11/15/22 126/72   12/13/21 138/86           Failed - Recent (12 mo) or future (30 days) visit within the authorizing provider's specialty        Failed - Normal serum creatinine on file in past 12 months     Recent Labs   Lab Test 11/15/22  1643   CR 1.03   Ok to refill medication if creatinine is low        Failed - Normal serum potassium on file in past 12 months     Recent Labs   Lab Test 11/15/22  1643   POTASSIUM 3.9          fluticasone (FLONASE) 50 MCG/ACT nasal spray 16 g 11     Sig: Spray 1 spray into both nostrils daily   Last Prescription Date:   11/15/22  Last Fill Qty/Refills:         16 g, R-11      Nasal Allergy Protocol Failed - 1/15/2024  3:07 PM        Failed - Recent (12 mo) or future (30 days) visit within  the authorizing provider's specialty       citalopram (CELEXA) 20 MG tablet 90 tablet 4     Sig: Take 1 tablet (20 mg) by mouth daily       SSRIs Protocol Failed - 1/15/2024  3:08 PM        Failed - Recent (12 mo) or future (30 days) visit within the authorizing provider's specialty       albuterol (PROAIR HFA/PROVENTIL HFA/VENTOLIN HFA) 108 (90 Base) MCG/ACT inhaler 18 g 11     Sig: Inhale 2 puffs into the lungs every 6 hours as needed for shortness of breath or wheezing   Last Prescription Date:   11/15/22  Last Fill Qty/Refills:         18 g, R-11      Asthma Maintenance Inhalers - Anticholinergics Failed - 1/15/2024  3:08 PM    Short-Acting Beta Agonist Inhalers Protocol  Failed - 1/15/2024  3:08 PM        Failed - Asthma control assessment score within normal limits in last 6 months     Please review ACT score.         Failed - Recent (6 mo) or future (30 days) visit within the authorizing provider's specialty     Last Office Visit:              11/15/22 ([facility])   Future Office visit:           None    Pt due for annual exam/establish care appointment in the absence of Dr. Jaramillo. Routing to provider for refill consideration. Routing to Unit scheduling pool, to assist Pt in scheduling appointment.     Unable to complete prescription refill per RN Medication Refill Policy.     Magaly Freedman RN .............. 1/15/2024  3:18 PM

## 2024-01-16 ENCOUNTER — MYC MEDICAL ADVICE (OUTPATIENT)
Dept: FAMILY MEDICINE | Facility: OTHER | Age: 54
End: 2024-01-16
Payer: COMMERCIAL

## 2024-01-18 RX ORDER — LISINOPRIL 40 MG/1
40 TABLET ORAL DAILY
Qty: 90 TABLET | Refills: 0 | OUTPATIENT
Start: 2024-01-18

## 2024-01-18 RX ORDER — FLUTICASONE PROPIONATE 50 MCG
1 SPRAY, SUSPENSION (ML) NASAL DAILY
Qty: 16 G | Refills: 2 | OUTPATIENT
Start: 2024-01-18

## 2024-01-18 RX ORDER — CITALOPRAM HYDROBROMIDE 20 MG/1
20 TABLET ORAL DAILY
Qty: 90 TABLET | Refills: 4 | Status: SHIPPED | OUTPATIENT
Start: 2024-01-18 | End: 2024-03-14

## 2024-01-18 RX ORDER — MONTELUKAST SODIUM 10 MG/1
10 TABLET ORAL AT BEDTIME
Qty: 90 TABLET | Refills: 0 | OUTPATIENT
Start: 2024-01-18

## 2024-01-18 RX ORDER — TRAZODONE HYDROCHLORIDE 50 MG/1
50 TABLET, FILM COATED ORAL AT BEDTIME
Qty: 90 TABLET | Refills: 4 | Status: SHIPPED | OUTPATIENT
Start: 2024-01-18 | End: 2024-03-14

## 2024-01-18 RX ORDER — ALBUTEROL SULFATE 90 UG/1
2 AEROSOL, METERED RESPIRATORY (INHALATION) EVERY 6 HOURS PRN
Qty: 18 G | Refills: 11 | Status: SHIPPED | OUTPATIENT
Start: 2024-01-18 | End: 2024-02-15

## 2024-01-18 RX ORDER — TRAZODONE HYDROCHLORIDE 50 MG/1
50 TABLET, FILM COATED ORAL AT BEDTIME
Qty: 90 TABLET | Refills: 0 | OUTPATIENT
Start: 2024-01-18

## 2024-01-18 RX ORDER — ALBUTEROL SULFATE 90 UG/1
2 AEROSOL, METERED RESPIRATORY (INHALATION) EVERY 6 HOURS PRN
Qty: 18 G | Refills: 2 | OUTPATIENT
Start: 2024-01-18

## 2024-01-18 RX ORDER — FLUTICASONE PROPIONATE 50 MCG
1 SPRAY, SUSPENSION (ML) NASAL DAILY
Qty: 16 G | Refills: 11 | Status: SHIPPED | OUTPATIENT
Start: 2024-01-18 | End: 2024-03-14

## 2024-01-18 RX ORDER — LISINOPRIL 40 MG/1
40 TABLET ORAL DAILY
Qty: 90 TABLET | Refills: 4 | Status: SHIPPED | OUTPATIENT
Start: 2024-01-18 | End: 2024-03-14

## 2024-01-18 RX ORDER — MONTELUKAST SODIUM 10 MG/1
10 TABLET ORAL AT BEDTIME
Qty: 90 TABLET | Refills: 4 | Status: SHIPPED | OUTPATIENT
Start: 2024-01-18 | End: 2024-03-14

## 2024-01-18 RX ORDER — CITALOPRAM HYDROBROMIDE 20 MG/1
20 TABLET ORAL DAILY
Qty: 90 TABLET | Refills: 0 | OUTPATIENT
Start: 2024-01-18

## 2024-01-18 NOTE — TELEPHONE ENCOUNTER
No.  I've never seen him, nor have we managed his meds here.    Signed, Arun Doe MD, FAAP, FACP  Internal Medicine & Pediatrics

## 2024-01-30 DIAGNOSIS — F41.9 ANXIETY: ICD-10-CM

## 2024-01-31 RX ORDER — CITALOPRAM HYDROBROMIDE 20 MG/1
20 TABLET ORAL DAILY
Qty: 90 TABLET | Refills: 2 | OUTPATIENT
Start: 2024-01-31

## 2024-01-31 NOTE — TELEPHONE ENCOUNTER
Duplicate request received from [facility]. Rx sent to [facility] pharmacy per pt request on 1/18/24 with 90 tab, and 4 refills. Refused refill at this time.     Serene Lott RN on 1/31/2024 at 3:43 PM

## 2024-02-04 ENCOUNTER — MYC REFILL (OUTPATIENT)
Dept: FAMILY MEDICINE | Facility: OTHER | Age: 54
End: 2024-02-04
Payer: COMMERCIAL

## 2024-02-04 DIAGNOSIS — J45.20 MILD INTERMITTENT ASTHMA WITHOUT COMPLICATION: ICD-10-CM

## 2024-02-04 RX ORDER — ALBUTEROL SULFATE 90 UG/1
2 AEROSOL, METERED RESPIRATORY (INHALATION) EVERY 6 HOURS PRN
Qty: 18 G | Refills: 11 | Status: CANCELLED | OUTPATIENT
Start: 2024-02-04

## 2024-02-15 ENCOUNTER — MYC REFILL (OUTPATIENT)
Dept: FAMILY MEDICINE | Facility: OTHER | Age: 54
End: 2024-02-15
Payer: COMMERCIAL

## 2024-02-15 DIAGNOSIS — J45.20 MILD INTERMITTENT ASTHMA WITHOUT COMPLICATION: ICD-10-CM

## 2024-02-19 NOTE — TELEPHONE ENCOUNTER
"  Last Prescription Date: 1/18/24  Last Qty/Refills: 18 g / 11  Last Office Visit: 11/15/22 [facility]  Future Office Visit: 3/14/24 Doe     Requested Prescriptions   Pending Prescriptions Disp Refills    albuterol (PROAIR HFA/PROVENTIL HFA/VENTOLIN HFA) 108 (90 Base) MCG/ACT inhaler 18 g 11     Sig: Inhale 2 puffs into the lungs every 6 hours as needed for shortness of breath or wheezing       Asthma Maintenance Inhalers - Anticholinergics Failed - 2/15/2024  6:31 AM        Failed - Asthma control assessment score within normal limits in last 6 months     Please review ACT score.           Failed - Recent (6 mo) or future (30 days) visit within the authorizing provider's specialty     Patient had office visit in the last 6 months or has a visit in the next 30 days with authorizing provider or within the authorizing provider's specialty.  See \"Patient Info\" tab in inbasket, or \"Choose Columns\" in Meds & Orders section of the refill encounter.            Passed - Patient is age 12 years or older        Passed - Medication is active on med list       Short-Acting Beta Agonist Inhalers Protocol  Failed - 2/15/2024  6:31 AM        Failed - Asthma control assessment score within normal limits in last 6 months     Please review ACT score.           Failed - Recent (6 mo) or future (30 days) visit within the authorizing provider's specialty     Patient had office visit in the last 6 months or has a visit in the next 30 days with authorizing provider or within the authorizing provider's specialty.  See \"Patient Info\" tab in inbasket, or \"Choose Columns\" in Meds & Orders section of the refill encounter.            Passed - Patient is age 12 or older        Passed - Medication is active on med list             "

## 2024-02-20 RX ORDER — ALBUTEROL SULFATE 90 UG/1
2 AEROSOL, METERED RESPIRATORY (INHALATION) EVERY 6 HOURS PRN
Qty: 18 G | Refills: 0 | Status: SHIPPED | OUTPATIENT
Start: 2024-02-20 | End: 2024-02-23

## 2024-02-23 ENCOUNTER — MYC MEDICAL ADVICE (OUTPATIENT)
Dept: PEDIATRICS | Facility: OTHER | Age: 54
End: 2024-02-23
Payer: COMMERCIAL

## 2024-02-23 DIAGNOSIS — J45.20 MILD INTERMITTENT ASTHMA WITHOUT COMPLICATION: ICD-10-CM

## 2024-02-23 RX ORDER — ALBUTEROL SULFATE 90 UG/1
2 AEROSOL, METERED RESPIRATORY (INHALATION) EVERY 6 HOURS PRN
Qty: 13.4 G | Refills: 0 | Status: SHIPPED | OUTPATIENT
Start: 2024-02-20 | End: 2024-03-14

## 2024-02-23 NOTE — TELEPHONE ENCOUNTER
Pt CRM request turned [facility] Message:    Topic: Tell us how we are doing.     Hello,     I need somebody to send a formulary exception form in to my insurance so they will cover my albuteral inhaler for asthma.     They have denied coverage until this form is sent in to them.     Please advise.     Thank you!     Scott Villegas  [phone]      My Response to Patient:    Hi Scott,    I'm so sorry we are just getting to this.  This was sent to a pool that does not get automatically routed to the nurses and providers.  Has this been taken care of yet?     Let me know,    Whitney RN     Response back from patient:    I'm not sure if it has been or not! I do know it has not yet been approved . I'm in-between providers I was with vasquez. Waiting to get in with Nader. Another Dr sent in prescription refills earlier which was great but now this denial came back for albuteral.

## 2024-02-23 NOTE — TELEPHONE ENCOUNTER
Found Albuterol Denial letter scanned in to chart from 2/14/2024.    Called [facility] Review at [phone].    Member ID: [id number]      Quantity dispensed must be a multiple of 6.7.  She ran through a trial of 13.4 and it went through.  She told me to adjust the quantity dispensed and include the following NDC number.     NDC #: [id number]    [facility]    Whitney Quezada RN on 2/23/2024 at 3:24 PM

## 2024-02-29 NOTE — TELEPHONE ENCOUNTER
Left message at [employer], to return call to [phone]. Magaly Freedman RN .............. 2/29/2024  2:34 PM

## 2024-02-29 NOTE — TELEPHONE ENCOUNTER
Patient comment: [employer] contacted me and said they sent you a notice that sunita type if pre authorization form has been requested for this medication.     Last Prescription Date:     albuterol (PROAIR HFA/PROVENTIL HFA/VENTOLIN HFA) 108 (90 Base) MCG/ACT inhaler 13.4 g 0 2/20/2024 -- No   Sig - Route: Inhale 2 puffs into the lungs every 6 hours as needed for shortness of breath or wheezing - Inhalation     [facility] - [place], [place] - [address]     Attempted reaching [employer] to see what is needed. Waited on hold >13 minutes. Will call again later today. Magaly Freedman RN .............. 2/29/2024  10:45 AM

## 2024-02-29 NOTE — TELEPHONE ENCOUNTER
Left message on Pt's voicemail to call back to [phone]. Magaly Freedman RN .............. 2/29/2024  4:39 PM

## 2024-02-29 NOTE — TELEPHONE ENCOUNTER
Writer realized this message was sent to prior authorization pool 24 days ago. Telephone note on 2/12/24 says medication is not covered and an appeal request was sent to Dr. De Los Santos on 2/13. New order was sent 2/20.    Need to call pharmacy, to verify whether this has been covered for Pt. Magaly Freedman RN .............. 2/29/2024  11:58 AM

## 2024-03-04 NOTE — TELEPHONE ENCOUNTER
3rd failed attempt to reach Pt. Writer will close encounter, and follow up if needed. Magaly Freedman RN .............. 3/4/2024  10:37 AM

## 2024-03-09 SDOH — HEALTH STABILITY: PHYSICAL HEALTH: ON AVERAGE, HOW MANY MINUTES DO YOU ENGAGE IN EXERCISE AT THIS LEVEL?: 20 MIN

## 2024-03-09 SDOH — HEALTH STABILITY: PHYSICAL HEALTH: ON AVERAGE, HOW MANY DAYS PER WEEK DO YOU ENGAGE IN MODERATE TO STRENUOUS EXERCISE (LIKE A BRISK WALK)?: 3 DAYS

## 2024-03-09 ASSESSMENT — ASTHMA QUESTIONNAIRES
ACT_TOTALSCORE: 22
QUESTION_3 LAST FOUR WEEKS HOW OFTEN DID YOUR ASTHMA SYMPTOMS (WHEEZING, COUGHING, SHORTNESS OF BREATH, CHEST TIGHTNESS OR PAIN) WAKE YOU UP AT NIGHT OR EARLIER THAN USUAL IN THE MORNING: NOT AT ALL
ACT_TOTALSCORE: 22
QUESTION_1 LAST FOUR WEEKS HOW MUCH OF THE TIME DID YOUR ASTHMA KEEP YOU FROM GETTING AS MUCH DONE AT WORK, SCHOOL OR AT HOME: NONE OF THE TIME
QUESTION_4 LAST FOUR WEEKS HOW OFTEN HAVE YOU USED YOUR RESCUE INHALER OR NEBULIZER MEDICATION (SUCH AS ALBUTEROL): ONCE A WEEK OR LESS
QUESTION_5 LAST FOUR WEEKS HOW WOULD YOU RATE YOUR ASTHMA CONTROL: WELL CONTROLLED
QUESTION_2 LAST FOUR WEEKS HOW OFTEN HAVE YOU HAD SHORTNESS OF BREATH: ONCE OR TWICE A WEEK

## 2024-03-09 ASSESSMENT — SOCIAL DETERMINANTS OF HEALTH (SDOH): HOW OFTEN DO YOU GET TOGETHER WITH FRIENDS OR RELATIVES?: ONCE A WEEK

## 2024-03-14 ENCOUNTER — MYC MEDICAL ADVICE (OUTPATIENT)
Dept: PEDIATRICS | Facility: OTHER | Age: 54
End: 2024-03-14

## 2024-03-14 ENCOUNTER — OFFICE VISIT (OUTPATIENT)
Dept: PEDIATRICS | Facility: OTHER | Age: 54
End: 2024-03-14
Attending: INTERNAL MEDICINE
Payer: COMMERCIAL

## 2024-03-14 VITALS
SYSTOLIC BLOOD PRESSURE: 144 MMHG | BODY MASS INDEX: 30.77 KG/M2 | HEART RATE: 62 BPM | HEIGHT: 65 IN | TEMPERATURE: 97.9 F | OXYGEN SATURATION: 97 % | DIASTOLIC BLOOD PRESSURE: 96 MMHG | WEIGHT: 184.7 LBS | RESPIRATION RATE: 18 BRPM

## 2024-03-14 DIAGNOSIS — F41.9 ANXIETY: Primary | ICD-10-CM

## 2024-03-14 DIAGNOSIS — J45.20 MILD INTERMITTENT ASTHMA WITHOUT COMPLICATION: ICD-10-CM

## 2024-03-14 DIAGNOSIS — R73.01 ELEVATED FASTING GLUCOSE: ICD-10-CM

## 2024-03-14 DIAGNOSIS — E78.1 HYPERTRIGLYCERIDEMIA: ICD-10-CM

## 2024-03-14 DIAGNOSIS — Z76.89 ENCOUNTER TO ESTABLISH CARE: ICD-10-CM

## 2024-03-14 DIAGNOSIS — F39 MOOD DISORDER (H): ICD-10-CM

## 2024-03-14 DIAGNOSIS — I10 BENIGN ESSENTIAL HYPERTENSION: ICD-10-CM

## 2024-03-14 DIAGNOSIS — J30.89 NON-SEASONAL ALLERGIC RHINITIS, UNSPECIFIED TRIGGER: ICD-10-CM

## 2024-03-14 DIAGNOSIS — Z00.00 ANNUAL PHYSICAL EXAM: Primary | ICD-10-CM

## 2024-03-14 DIAGNOSIS — F41.9 ANXIETY: ICD-10-CM

## 2024-03-14 DIAGNOSIS — F41.1 GAD (GENERALIZED ANXIETY DISORDER): ICD-10-CM

## 2024-03-14 LAB
ANION GAP SERPL CALCULATED.3IONS-SCNC: 10 MMOL/L (ref 7–15)
BUN SERPL-MCNC: 14.6 MG/DL (ref 6–20)
CALCIUM SERPL-MCNC: 9.7 MG/DL (ref 8.6–10)
CHLORIDE SERPL-SCNC: 103 MMOL/L (ref 98–107)
CHOLEST SERPL-MCNC: 214 MG/DL
CREAT SERPL-MCNC: 1.07 MG/DL (ref 0.67–1.17)
DEPRECATED HCO3 PLAS-SCNC: 27 MMOL/L (ref 22–29)
EGFRCR SERPLBLD CKD-EPI 2021: 83 ML/MIN/1.73M2
FASTING STATUS PATIENT QL REPORTED: NO
GLUCOSE SERPL-MCNC: 88 MG/DL (ref 70–99)
HBA1C MFR BLD: 5.4 % (ref 4–6.2)
HDLC SERPL-MCNC: 43 MG/DL
LDLC SERPL CALC-MCNC: 118 MG/DL
NONHDLC SERPL-MCNC: 171 MG/DL
POTASSIUM SERPL-SCNC: 4.4 MMOL/L (ref 3.4–5.3)
SODIUM SERPL-SCNC: 140 MMOL/L (ref 135–145)
TRIGL SERPL-MCNC: 263 MG/DL

## 2024-03-14 PROCEDURE — 83036 HEMOGLOBIN GLYCOSYLATED A1C: CPT | Mod: ZL | Performed by: INTERNAL MEDICINE

## 2024-03-14 PROCEDURE — 99396 PREV VISIT EST AGE 40-64: CPT | Performed by: INTERNAL MEDICINE

## 2024-03-14 PROCEDURE — 36415 COLL VENOUS BLD VENIPUNCTURE: CPT | Mod: ZL | Performed by: INTERNAL MEDICINE

## 2024-03-14 PROCEDURE — 80061 LIPID PANEL: CPT | Mod: ZL | Performed by: INTERNAL MEDICINE

## 2024-03-14 PROCEDURE — 99214 OFFICE O/P EST MOD 30 MIN: CPT | Mod: 25 | Performed by: INTERNAL MEDICINE

## 2024-03-14 PROCEDURE — 80048 BASIC METABOLIC PNL TOTAL CA: CPT | Mod: ZL | Performed by: INTERNAL MEDICINE

## 2024-03-14 RX ORDER — HYDROCHLOROTHIAZIDE 25 MG/1
25 TABLET ORAL DAILY
Qty: 90 TABLET | Refills: 4 | Status: SHIPPED | OUTPATIENT
Start: 2024-03-14 | End: 2024-04-10

## 2024-03-14 RX ORDER — FLUTICASONE PROPIONATE 50 MCG
1 SPRAY, SUSPENSION (ML) NASAL DAILY
Qty: 16 G | Refills: 11 | Status: SHIPPED | OUTPATIENT
Start: 2024-03-14

## 2024-03-14 RX ORDER — INHALER, ASSIST DEVICES
SPACER (EA) MISCELLANEOUS
Qty: 1 EACH | Refills: 11 | Status: SHIPPED | OUTPATIENT
Start: 2024-03-14

## 2024-03-14 RX ORDER — BUDESONIDE AND FORMOTEROL FUMARATE DIHYDRATE 160; 4.5 UG/1; UG/1
AEROSOL RESPIRATORY (INHALATION)
Qty: 20.4 G | Refills: 11 | Status: SHIPPED | OUTPATIENT
Start: 2024-03-14 | End: 2024-04-29

## 2024-03-14 RX ORDER — MONTELUKAST SODIUM 10 MG/1
10 TABLET ORAL AT BEDTIME
Qty: 90 TABLET | Refills: 4 | Status: SHIPPED | OUTPATIENT
Start: 2024-03-14

## 2024-03-14 RX ORDER — TRAZODONE HYDROCHLORIDE 50 MG/1
50 TABLET, FILM COATED ORAL AT BEDTIME
Qty: 90 TABLET | Refills: 4 | Status: SHIPPED | OUTPATIENT
Start: 2024-03-14

## 2024-03-14 RX ORDER — ATORVASTATIN CALCIUM 10 MG/1
10 TABLET, FILM COATED ORAL DAILY
Qty: 90 TABLET | Refills: 4 | Status: SHIPPED | OUTPATIENT
Start: 2024-03-14

## 2024-03-14 RX ORDER — CITALOPRAM HYDROBROMIDE 20 MG/1
20 TABLET ORAL DAILY
Qty: 90 TABLET | Refills: 4 | Status: SHIPPED | OUTPATIENT
Start: 2024-03-14

## 2024-03-14 RX ORDER — LISINOPRIL 40 MG/1
40 TABLET ORAL DAILY
Qty: 90 TABLET | Refills: 4 | Status: SHIPPED | OUTPATIENT
Start: 2024-03-14

## 2024-03-14 ASSESSMENT — PAIN SCALES - GENERAL: PAINLEVEL: NO PAIN (0)

## 2024-03-14 NOTE — NURSING NOTE
Patient is here to establish care and annual physical. No concerns at this time.     No LMP for male patient.  Medication Reconciliation: complete    Flower Sanders LPN 3/14/2024 3:32 PM       Advance care directive on file? no  Advance care directive provided to patient? yes       Flower Sanders LPN

## 2024-03-14 NOTE — LETTER
My Asthma Action Plan    Name: Scott Villegas   YOB: 1970  Date: 3/14/2024   My doctor: Arun Doe MD   My clinic: [facility]        My Rescue Medicine:   Symbicort via SMART   My Asthma Severity:   Intermittent / Exercise Induced  Know your asthma triggers: upper respiratory infections             GREEN ZONE   Good Control  I feel good  No cough or wheeze  Can work, sleep and play without asthma symptoms       Take your asthma control medicine every day.     If exercise triggers your asthma, take your rescue medication  15 minutes before exercise or sports, and  During exercise if you have asthma symptoms  Spacer to use with inhaler: If you have a spacer, make sure to use it with your inhaler             YELLOW ZONE Getting Worse  I have ANY of these:  I do not feel good  Cough or wheeze  Chest feels tight  Wake up at night   Keep taking your Green Zone medications  Start taking your rescue medicine:  every 20 minutes for up to 1 hour. Then every 4 hours for 24-48 hours.  If you stay in the Yellow Zone for more than 12-24 hours, contact your doctor.  If you do not return to the Green Zone in 12-24 hours or you get worse, start taking your oral steroid medicine if prescribed by your provider.           RED ZONE Medical Alert - Get Help  I have ANY of these:  I feel awful  Medicine is not helping  Breathing getting harder  Trouble walking or talking  Nose opens wide to breathe       Take your rescue medicine NOW  If your provider has prescribed an oral steroid medicine, start taking it NOW  Call your doctor NOW  If you are still in the Red Zone after 20 minutes and you have not reached your doctor:  Take your rescue medicine again and  Call 911 or go to the emergency room right away    See your regular doctor within 2 weeks of an Emergency Room or Urgent Care visit for follow-up treatment.          Annual Reminders:  Meet with Asthma Educator,  Flu Shot in the Fall,  consider Pneumonia Vaccination for patients with asthma (aged 19 and older).    Pharmacy:    [id number] ([facility] - [place], [place] - [address]  [facility] - [place], [place] - [address]    Electronically signed by Arun Doe MD   Date: 03/14/24                    Asthma Triggers  How To Control Things That Make Your Asthma Worse    Triggers are things that make your asthma worse.  Look at the list below to help you find your triggers and   what you can do about them. You can help prevent asthma flare-ups by staying away from your triggers.      Trigger                                                          What you can do   Cigarette Smoke  Tobacco smoke can make asthma worse. Do not allow smoking in your home, car or around you.  Be sure no one smokes at a child s day care or school.  If you smoke, ask your health care provider for ways to help you quit.  Ask family members to quit too.  Ask your health care provider for a referral to Quit Plan to help you quit smoking, or call 6-672-923-PLAN.     Colds, Flu, Bronchitis  These are common triggers of asthma. Wash your hands often.  Don t touch your eyes, nose or mouth.  Get a flu shot every year.     Dust Mites  These are tiny bugs that live in cloth or carpet. They are too small to see. Wash sheets and blankets in hot water every week.   Encase pillows and mattress in dust mite proof covers.  Avoid having carpet if you can. If you have carpet, vacuum weekly.   Use a dust mask and HEPA vacuum.   Pollen and Outdoor Mold  Some people are allergic to trees, grass, or weed pollen, or molds. Try to keep your windows closed.  Limit time out doors when pollen count is high.   Ask you health care provider about taking medicine during allergy season.     Animal Dander  Some people are allergic to skin flakes, urine or saliva from pets with fur or feathers. Keep pets with fur or feathers out of your  home.    If you can t keep the pet outdoors, then keep the pet out of your bedroom.  Keep the bedroom door closed.  Keep pets off cloth furniture and away from stuffed toys.     Mice, Rats, and Cockroaches  Some people are allergic to the waste from these pests.   Cover food and garbage.  Clean up spills and food crumbs.  Store grease in the refrigerator.   Keep food out of the bedroom.   Indoor Mold  This can be a trigger if your home has high moisture. Fix leaking faucets, pipes, or other sources of water.   Clean moldy surfaces.  Dehumidify basement if it is damp and smelly.   Smoke, Strong Odors, and Sprays  These can reduce air quality. Stay away from strong odors and sprays, such as perfume, powder, hair spray, paints, smoke incense, paint, cleaning products, candles and new carpet.   Exercise or Sports  Some people with asthma have this trigger. Be active!  Ask your doctor about taking medicine before sports or exercise to prevent symptoms.    Warm up for 5-10 minutes before and after sports or exercise.     Other Triggers of Asthma  Cold air:  Cover your nose and mouth with a scarf.  Sometimes laughing or crying can be a trigger.  Some medicines and food can trigger asthma.

## 2024-03-14 NOTE — PROGRESS NOTES
Preventive Care Visit  [facility]  Arun Doe MD, Internal Medicine  Mar 14, 2024      1. Annual physical exam  2. Encounter to establish care    3. Benign essential hypertension  Not at goal.  Recommend the addition of hydrochlorothiazide.  Due for basic metabolic panel.  Recommend BMP in 1 month.  Lifestyle modification recommended.  - lisinopril (ZESTRIL) 40 MG tablet; Take 1 tablet (40 mg) by mouth daily  Dispense: 90 tablet; Refill: 4  - hydrochlorothiazide (HYDRODIURIL) 25 MG tablet; Take 1 tablet (25 mg) by mouth daily  Dispense: 90 tablet; Refill: 4  - Basic Metabolic Panel; Future  - Basic metabolic panel; Future  - Basic metabolic panel    4. Mild intermittent asthma without complication  Symptoms have been well-controlled with albuterol though using about monthly.  Discussed options and decided to switch to Symbicort via smart therapy.  I recommend starting the use of a holding chamber.  - montelukast (SINGULAIR) 10 MG tablet; Take 1 tablet (10 mg) by mouth at bedtime  Dispense: 90 tablet; Refill: 4  - Spacer/Aero-Holding Chambers (VORTEX VALVED HOLDING CHAMBER) RAMON; Use with HFA  Dispense: 1 each; Refill: 11  - budesonide-formoterol (SYMBICORT) 160-4.5 MCG/ACT Inhaler; Inhale 2 puffs once daily plus 1-2 puffs as needed. May use up to 12 puffs per day.  Dispense: 20.4 g; Refill: 11    5. Anxiety  Symptoms are mostly well-controlled.  No medication changes today.  Recommend lifestyle modification, see therapist.  - citalopram (CELEXA) 20 MG tablet; Take 1 tablet (20 mg) by mouth daily  Dispense: 90 tablet; Refill: 4  - traZODone (DESYREL) 50 MG tablet; Take 1 tablet (50 mg) by mouth at bedtime  Dispense: 90 tablet; Refill: 4    6. Non-seasonal allergic rhinitis, unspecified trigger  At goal, no change.  - fluticasone (FLONASE) 50 MCG/ACT nasal spray; Spray 1 spray into both nostrils daily  Dispense: 16 g; Refill: 11  - montelukast (SINGULAIR) 10 MG tablet; Take 1 tablet  (10 mg) by mouth at bedtime  Dispense: 90 tablet; Refill: 4    7. Mood disorder (H24)  8. DOUGLAS (generalized anxiety disorder)  - Adult Mental Health  Referral; Future    9. Hypertriglyceridemia  Recommend starting atorvastatin based on 10-year risk score  - Lipid Profile; Future  - atorvastatin (LIPITOR) 10 MG tablet; Take 1 tablet (10 mg) by mouth daily  Dispense: 90 tablet; Refill: 4  - Lipid Profile    10. Elevated fasting glucose  - Hemoglobin A1c; Future  - Hemoglobin A1c    Patient Instructions    -- Continue lisinopril   -- Start hydrochlorothiazide at 1/2 tablet first week, then full tablet   -- Send log in 2 weeks   -- Nurse only in 1 month   -- Lab only in 1 month     -- Daily outdoor exercise   -- Gratitude list   -- See a therapist     -- Start atorvastatin     -- COVID, flu soon   -- Get the new shingles vaccine series from a nurse-only visit, pharmacy or [facility] ([facility] RN).      The 10-year ASCVD risk score (Martha JOENS, et al., 2019) is: 9%    Values used to calculate the score:      Age: 53 years      Sex: Male      Is Non- : No      Diabetic: No      Tobacco smoker: No      Systolic Blood Pressure: 144 mmHg      Is BP treated: Yes      HDL Cholesterol: 33 mg/dL      Total Cholesterol: 200 mg/dL      Check your Blood Pressure   -- Sit in a chair, feet flat on the floor for 5 minutes   -- Avoid caffeine, exercise and smoking for 30 minutes before checking   -- Have your arm at the level of your heart   -- Make sure you have the correct size cuff   -- Write them down, bring your log book in to your appointment   -- Goal blood pressure 120/70     -- Learn about DASH Diet for dietary ways to reduce blood pressure  Google: NIH DASH diet  NIH site: https://www.nhlbi.nih.gov/health-topics/dash-eating-plan  PDF from NIH: https://www.nhlbi.nih.gov/files/docs/public/heart/new_dash.pdf    What should I do?   -- Reduce salt intake (box, can, package, restaurant, salt  shaker)   -- Reduce caffeine   -- Reduce alcohol   -- Work on 5% weight loss   -- Daily physical exercise (American Heart recommends 30-45 minutes of brisk walking 5 days per week)      Your BMI is Body mass index is 30.77 kg/m .  (BMI ranges: Normal 18.5 - 25, Overweight 25 - 30, Obesity greater than 30,     Morbid Obesity greater than 40 or greater than 35 with associated conditions.)    Facts about losing weight:   -- Overweight and Obesity increase your risk for developing diabetes, high blood pressure and stroke, and shorten your life.   -- 90% of weight loss comes from dietary changes, only 10% from exercise    What should I do?   -- Work on 5-10% weight loss   -- Focus on a few healthy dietary changes   -- Eat more fresh fruits and vegetables, and fewer carbohydrates   -- Cut out all calorie-containing beverages (milk, juice, alcohol, etc)   -- Exercise every day   -- Weigh yourself once a week   -- Learn about DASH Diet for dietary ways to reduce blood pressure  Google: [facility] DASH diet  NIH site: https://www.nhlbi.nih.gov/health-topics/dash-eating-plan  PDF from [facility]: https://www.nhlbi.nih.gov/files/docs/public/heart/new_dash.pdf   -- Consider Weight Watchers (http://www.weightwatchers.com)   -- Consider My Fitness Pal (iOS, Android, http://www.Family Nationpal.[employer] [facility])   -- Consider time-restricted eating (eg. eating between noon and 8pm only)          [place] counselors/therapists   Telephone Kids? Address   [facility] [phone] Yes, 12+ [address]  [email or web address]   [facility]  (Many counselors) [phone] Yes [address]   [email or web address]://[email or web address]   [facility]  (Many counselors) [phone] Yes [address]   [email or web address]://[email or web address]   [facility]  (Many counselors) [phone] Yes [address]  [email or web address]://[email or web address]   Cyndee Villa  [place] [phone] Yes [facility]  [address]  [email or web address]   [facility]  Yoni Yobany [phone] Yes [address]   [address]  [email or web address]   Ceci Rogers [phone]  [address]  [email or web address]   [facility]  Sanam Cleveland  And others... [phone]  [address]  [email or web address]   Melania Charles [phone]  [address]   Nighat Kandi [phone]  [address] [place] [address]   Brielle Bowman [phone] Yes [address]   Tiffanie Schroeder [phone]  [address]    Ankush Campo [phone]  [address] [place] [address]   [facility]  Kemi Stokes [phone]  [address] [place] [address]    Georgia Pastor [phone]  [address] [place] [address] [address]  ([facility])  [email or web address]   [facility]  Mark Bell [phone]  [address]  [email or web address]  [email or web address]   [facility]  Michele Rinne Cindy Thomas [phone]     [facility]: [place]anna [phone] Yes RICHIE Heredia  [address]  http://[email or web address]   [facility]: [place] [phone] Yes RICHIE Post  [address] [facility]  [email or web address]://[email or web address]        Signed, Arun Doe MD, FAAP, FACP  Internal Medicine & Pediatrics        Subjective   Scott is a 53 year old, presenting for the following:  Establish Care and Physical  Needs refills.      3/14/2024     3:28 PM   Additional Questions   Roomed by [facility]ukes        Health Care Directive  Patient does not have a Health Care Directive or Living Will: Discussed advance care planning with patient; information given to patient to review.    HPI              3/9/2024   General Health   How would you rate your overall physical health? Good   Feel stress (tense, anxious, or unable to sleep)  Rather much   (!) STRESS CONCERN      3/9/2024   Nutrition   Three or more servings of calcium each day? Yes   Diet: Regular (no restrictions)   How many servings of fruit and vegetables per day? (!) 2-3   How many sweetened beverages each day? 0-1         3/9/2024   Exercise   Days per week of moderate/strenous exercise 3 days   Average minutes spent exercising at this level 20 min         3/9/2024   Social Factors   Frequency of gathering with friends or relatives Once a week   Worry food won't last until get money to buy more No   Food not last or not have enough money for food? No   Do you have housing?  Yes   Are you worried about losing your housing? No   Lack of transportation? No   Unable to get utilities (heat,electricity)? No         3/9/2024   Fall Risk   Fallen 2 or more times in the past year? No   Trouble with walking or balance? No          3/9/2024   Dental   Dentist two times every year? (!) NO         3/9/2024   TB Screening   Were you born outside of [place]?  No         Today's PHQ-2 Score:       3/14/2024     3:25 PM   PHQ-2 ( 1999 Pf[employer])   Q1: Little interest or pleasure in doing things 0   Q2: Feeling down, depressed or hopeless 1   PHQ-2 Score 1   Q1: Little interest or pleasure in doing things Not at all   Q2: Feeling down, depressed or hopeless Several days   PHQ-2 Score 1           3/9/2024   Substance Use   Alcohol more than 3/day or more than 7/wk No   Do you use any other substances recreationally? No     Social History     Tobacco Use    Smoking status: Never     Passive exposure: Never    Smokeless tobacco: Never   Vaping Use    Vaping Use: Never used   Substance Use Topics    Alcohol use: Yes     Comment: couple times per week    Drug use: Never           3/9/2024   STI Screening   New sexual partner(s) since last STI/HIV test? No   ASCVD Risk   The 10-year ASCVD risk score (Martha JONES, et al., 2019) is: 7.7%    Values used to calculate the score:      Age: 53 years      Sex:  "Male      Is Non- : No      Diabetic: No      Tobacco smoker: No      Systolic Blood Pressure: 144 mmHg      Is BP treated: Yes      HDL Cholesterol: 43 mg/dL      Total Cholesterol: 214 mg/dL           Reviewed and updated as needed this visit by Provider      Problems   Surg Hx                      Objective    Exam  BP (!) 144/96   Pulse 62   Temp 97.9  F (36.6  C) (Tympanic)   Resp 18   Ht 1.65 m (5' 4.96\")   Wt 83.8 kg (184 lb 11.2 oz)   SpO2 97%   BMI 30.77 kg/m     Estimated body mass index is 30.77 kg/m  as calculated from the following:    Height as of this encounter: 1.65 m (5' 4.96\").    Weight as of this encounter: 83.8 kg (184 lb 11.2 oz).    Physical Exam  Gen: Alert, NAD.  Neck: No carotid bruits  CV: RRR no m/r/g  Pulm: CTAB, no w/r/r. No increased work of breathing  Msk: No LE edema  Neuro: Grossly intact  Skin: No concerning lesions.  Psychiatric: Normal affect and insight. Does not appear anxious or depressed.          Signed Electronically by: Arun Doe MD    "

## 2024-03-14 NOTE — PATIENT INSTRUCTIONS
-- Continue lisinopril   -- Start hydrochlorothiazide at 1/2 tablet first week, then full tablet   -- Send log in 2 weeks   -- Nurse only in 1 month   -- Lab only in 1 month     -- Daily outdoor exercise   -- Gratitude list   -- See a therapist     -- Start atorvastatin     -- COVID, flu soon   -- Get the new shingles vaccine series from a nurse-only visit, pharmacy or [facility] ([facility] RN).      The 10-year ASCVD risk score (Martha JONES, et al., 2019) is: 9%    Values used to calculate the score:      Age: 53 years      Sex: Male      Is Non- : No      Diabetic: No      Tobacco smoker: No      Systolic Blood Pressure: 144 mmHg      Is BP treated: Yes      HDL Cholesterol: 33 mg/dL      Total Cholesterol: 200 mg/dL      Check your Blood Pressure   -- Sit in a chair, feet flat on the floor for 5 minutes   -- Avoid caffeine, exercise and smoking for 30 minutes before checking   -- Have your arm at the level of your heart   -- Make sure you have the correct size cuff   -- Write them down, bring your log book in to your appointment   -- Goal blood pressure 120/70     -- Learn about DASH Diet for dietary ways to reduce blood pressure  Google: NIH DASH diet  NIH site: https://www.nhlbi.nih.gov/health-topics/dash-eating-plan  PDF from [facility]: https://www.nhlbi.nih.gov/files/docs/public/heart/new_dash.pdf    What should I do?   -- Reduce salt intake (box, can, package, restaurant, salt shaker)   -- Reduce caffeine   -- Reduce alcohol   -- Work on 5% weight loss   -- Daily physical exercise (American Heart recommends 30-45 minutes of brisk walking 5 days per week)      Your BMI is Body mass index is 30.77 kg/m .  (BMI ranges: Normal 18.5 - 25, Overweight 25 - 30, Obesity greater than 30,     Morbid Obesity greater than 40 or greater than 35 with associated conditions.)    Facts about losing weight:   -- Overweight and Obesity increase your risk for developing diabetes, high blood pressure and  stroke, and shorten your life.   -- 90% of weight loss comes from dietary changes, only 10% from exercise    What should I do?   -- Work on 5-10% weight loss   -- Focus on a few healthy dietary changes   -- Eat more fresh fruits and vegetables, and fewer carbohydrates   -- Cut out all calorie-containing beverages (milk, juice, alcohol, etc)   -- Exercise every day   -- Weigh yourself once a week   -- Learn about DASH Diet for dietary ways to reduce blood pressure  Google: [facility] DASH diet  NIH site: https://www.nhlbi.nih.gov/health-topics/dash-eating-plan  PDF from [facility]: https://www.nhlbi.nih.gov/files/docs/public/heart/new_dash.pdf   -- Consider Weight Watchers (http://www.weightwatchers.Dustcloud)   -- Consider My Fitness Pal (iOS, Android, http://www.Conspire.Dustcloud)   -- Consider time-restricted eating (eg. eating between noon and 8pm only)          [place] counselors/therapists   Telephone Kids? Address   [facility] [phone] Yes, 12+ [address]  [email or web address]   [facility]  (Many counselors) [phone] Yes [address] [place] [address]   [email or web address]://[email or web address]   [facility]  (Many counselors) [phone] Yes [address] [facility] [address] [place]   [email or web address]://[email or web address]   [facility]  (Many counselors) [phone] Yes [address]  [email or web address]://[email or web address]   [facility]  Allen Cotter [phone] Yes [facility]  [address]  [email or web address]   [facility]  Yoni Haywood [phone] Yes [address]   [address]  [email or web address]://[email or web address] Saltwater [email or web address]   Ceci Rogers [phone]  [address]  [email or web address]   [facility]  Don Brennan  Lavern Nikkel  And others... [phone]  [address] [facility] [address]  https://www.DIY GeniusAl[facility] ClinicStarline Promotions.Dustcloud/   Melania Charles [phone]  [address] [facility] Jose Roberto   Nighat  Kandi [phone]  [address] [facility] [address]   Brielle Bowman [phone] Yes [address] Schro[address] [phone]  [address]   Ankush Alber [phone]  [address]   [facility]  Kemi Stokes [phone]  [address]   [address] [place] [address]    Georgia Pastor [phone]  [address] [place] [address] [address]  ([facility])  [email or web address]   [facility]  Mark Bell [phone]  [address]  [email or web address]  [email or web address]   [facility]  Michele Rinne Cindy Thomas [phone]     [facility] [place] [phone] Yes RICHIE Heredia  [address]  [email or web address]://[email or web address]   [facility]: [place] [phone] Yes RICHIE Post  [address] [place] [address]  [email or web address]://[email or web address]

## 2024-03-15 RX ORDER — CITALOPRAM HYDROBROMIDE 40 MG/1
40 TABLET ORAL DAILY
Qty: 90 TABLET | Refills: 4 | Status: CANCELLED | OUTPATIENT
Start: 2024-03-15

## 2024-03-15 NOTE — TELEPHONE ENCOUNTER
3/14  OV with Nader for physical and to establish care.     Current Citalopram dosage 20mg daily.     Mark'd up 40mg dose.  PCP out until Tuesday.  Patient update on MyChart.    Whitney Quezada RN on 3/15/2024 at 8:44 AM

## 2024-03-20 RX ORDER — CITALOPRAM HYDROBROMIDE 40 MG/1
40 TABLET ORAL DAILY
Qty: 90 TABLET | Refills: 4 | Status: SHIPPED | OUTPATIENT
Start: 2024-03-20

## 2024-03-31 ENCOUNTER — MYC MEDICAL ADVICE (OUTPATIENT)
Dept: PEDIATRICS | Facility: OTHER | Age: 54
End: 2024-03-31
Payer: COMMERCIAL

## 2024-03-31 DIAGNOSIS — I10 BENIGN ESSENTIAL HYPERTENSION: ICD-10-CM

## 2024-03-31 DIAGNOSIS — N52.9 ERECTILE DYSFUNCTION, UNSPECIFIED ERECTILE DYSFUNCTION TYPE: Primary | ICD-10-CM

## 2024-04-01 RX ORDER — SILDENAFIL 50 MG/1
50 TABLET, FILM COATED ORAL DAILY PRN
Qty: 30 TABLET | Refills: 11 | Status: SHIPPED | OUTPATIENT
Start: 2024-04-01

## 2024-04-01 NOTE — TELEPHONE ENCOUNTER
Any blood pressure medication can cause erectile dysfunction although hydrochlorothiazide is less likely to do so.     --Continue hydrochlorothiazide   --Trial of sildenafil   --Yes I do recommend starting atorvastatin   --Schedule medication management visit in the next 3 or 4 weeks okay to use provider add.    Signed, Arun Doe MD, FAAP, FACP  Internal Medicine & Pediatrics

## 2024-04-01 NOTE — TELEPHONE ENCOUNTER
3/14/2024  OV with Nader for annual physical.     Hydrochlorothiazide started for BP.  (Patient having ED symptoms on this)    Atorvastatin ordered for cholesterol.  (Patient has not yet started).      Whitney Quezada RN on 4/1/2024 at 10:38 AM

## 2024-04-10 RX ORDER — AMLODIPINE BESYLATE 5 MG/1
5 TABLET ORAL DAILY
Qty: 90 TABLET | Refills: 3 | Status: SHIPPED | OUTPATIENT
Start: 2024-04-10

## 2024-04-10 NOTE — TELEPHONE ENCOUNTER
All antihypertensive agents can cause erectile dysfunction.      ICD-10-CM    1. Erectile dysfunction, unspecified erectile dysfunction type  N52.9 sildenafil (VIAGRA) 50 MG tablet      2. Benign essential hypertension  I10 amLODIPine (NORVASC) 5 MG tablet           Signed, Arun Doe MD, FAAP, FACP  Internal Medicine & Pediatrics

## 2024-04-28 ENCOUNTER — MYC MEDICAL ADVICE (OUTPATIENT)
Dept: PEDIATRICS | Facility: OTHER | Age: 54
End: 2024-04-28
Payer: COMMERCIAL

## 2024-04-28 DIAGNOSIS — J45.20 MILD INTERMITTENT ASTHMA WITHOUT COMPLICATION: ICD-10-CM

## 2024-04-29 ENCOUNTER — TELEPHONE (OUTPATIENT)
Dept: PEDIATRICS | Facility: OTHER | Age: 54
End: 2024-04-29
Payer: COMMERCIAL

## 2024-04-29 DIAGNOSIS — J45.20 MILD INTERMITTENT ASTHMA WITHOUT COMPLICATION: ICD-10-CM

## 2024-04-29 RX ORDER — BUDESONIDE AND FORMOTEROL FUMARATE DIHYDRATE 160; 4.5 UG/1; UG/1
AEROSOL RESPIRATORY (INHALATION)
Qty: 20.4 G | Refills: 9 | Status: SHIPPED | OUTPATIENT
Start: 2024-04-29

## 2024-04-29 NOTE — PROCEDURES
03/14/24 1610 Arun Barrientos MD      Outpatient Medication Detail     Disp Refills Start End PRIYA   budesonide-formoterol (SYMBICORT) 160-4.5 MCG/ACT Inhaler 20.4 g 11 3/14/2024 -- No   Sig: Inhale 2 puffs once daily plus 1-2 puffs as needed. May use up to 12 puffs per day.   Sent to pharmacy as: Budesonide-Formoterol Fumarate 160-4.5 MCG/ACT Inhalation Aerosol (SYMBICORT)   Class: E-Prescribe   Notes to Pharmacy: Please dispense #2 10.2g inhalers for a 30-day supply per JUAN 2021 guidelines. If reject arises, enter DUR codes: HD / M0 / 1G. Note: MA prefers brand Symbicort.   Order: [id number]   E-Prescribing Status: Transmission to pharmacy failed (4/29/2024  9:04 AM CDT)   Prior authorization: Payer Waiting for Response     [facility] - [place], [place] - [address]     Please resend. Magaly Freedman RN .............. 4/29/2024  9:25 AM

## 2024-04-29 NOTE — TELEPHONE ENCOUNTER
03/14/24 1610 Arun Barrientos MD      budesonide-formoterol (SYMBICORT) 160-4.5 MCG/ACT Inhaler 20.4 g 11 3/14/2024 -- No   Sig: Inhale 2 puffs once daily plus 1-2 puffs as needed. May use up to 12 puffs per day.   Sent to pharmacy as: Budesonide-Formoterol Fumarate 160-4.5 MCG/ACT Inhalation Aerosol (SYMBICORT)   Class: E-Prescribe   Notes to Pharmacy: Please dispense #2 10.2g inhalers for a 30-day supply per JUAN 2021 guidelines. If reject arises, enter DUR codes: HD / M0 / 1G. Note: MA prefers brand Symbicort.   Order: [id number]   E-Prescribing Status: Transmission to pharmacy failed (4/29/2024  9:04 AM CDT)   Message completed by Magaly Freedman RN (4/29/2024 9:26 AM).   Prior authorization: Payer Waiting for Response     [facility] - [place], [place] - [address]     Order resent, per original order.     E-Prescribing Status: Receipt confirmed by pharmacy (4/29/2024  9:54 AM CDT).    Magaly Freedman RN .............. 4/29/2024  9:54 AM

## 2024-04-29 NOTE — PROGRESS NOTES
I don't understand the question here.    Signed, Arun Doe MD, FAAP, FACP  Internal Medicine & Pediatrics

## 2024-04-30 RX ORDER — ALBUTEROL SULFATE 90 UG/1
2 AEROSOL, METERED RESPIRATORY (INHALATION) EVERY 6 HOURS PRN
Qty: 18 G | Refills: 11 | Status: SHIPPED | OUTPATIENT
Start: 2024-04-30

## 2024-05-16 ENCOUNTER — MYC MEDICAL ADVICE (OUTPATIENT)
Dept: PEDIATRICS | Facility: OTHER | Age: 54
End: 2024-05-16
Payer: COMMERCIAL

## 2024-05-20 NOTE — TELEPHONE ENCOUNTER
As long as his BP is controlled, we can discuss in our next OV.    Signed, Arun Doe MD, FAAP, FACP  Internal Medicine & Pediatrics

## 2024-06-19 ENCOUNTER — MYC MEDICAL ADVICE (OUTPATIENT)
Dept: PEDIATRICS | Facility: OTHER | Age: 54
End: 2024-06-19
Payer: COMMERCIAL

## 2024-06-19 NOTE — TELEPHONE ENCOUNTER
Message below came in as CRM Request:      ===View-only below this line===      ----- Message -----       From:Scott Villegas       Sent:6/18/2024  8:39 PM CDT         To:Customer Service    Subject:Child access    Topic: Non-Medical Question.    How do I get access to my daughters Alondra?  (14)    Mom and dad are  and we ith need access     Thank you!

## 2025-02-05 DIAGNOSIS — I10 BENIGN ESSENTIAL HYPERTENSION: ICD-10-CM

## 2025-02-07 NOTE — TELEPHONE ENCOUNTER
Requested Prescriptions   Pending Prescriptions Disp Refills    amLODIPine (NORVASC) 5 MG tablet [Pharmacy Med Name: Amlodipine Besylate 5mg Tablet] 90 tablet 2     Sig: Take 1 tablet by mouth daily.       Calcium Channel Blockers Protocol  Failed - 2/7/2025 11:11 AM        Failed - Most recent blood pressure under 140/90 in past 12 months     BP Readings from Last 3 Encounters:   03/14/24 (!) 144/96   11/15/22 126/72   12/13/21 138/86           Passed - Medication is active on med list        Passed - Medication is indicated for associated diagnosis        Passed - GFR is on file in the past 12 months and most recent GFR is normal        Passed - Recent (12 mo) or future (90 days) visit within the authorizing provider's specialty     The patient must have completed an in-person or virtual visit within the past 12 months or has a future visit scheduled within the next 90 days with the authorizing provider s specialty.  Urgent care and e-visits do not qualify as an office visit for this protocol.  LOV 3/14/24        Passed - Patient is age 18 or older         Patient has appt for upcoming PX on 4/14/25    Serene Ramsey RN on 2/7/2025 at 11:12 AM

## 2025-02-10 RX ORDER — AMLODIPINE BESYLATE 5 MG/1
5 TABLET ORAL DAILY
Qty: 90 TABLET | Refills: 0 | Status: SHIPPED | OUTPATIENT
Start: 2025-02-10

## 2025-02-22 DIAGNOSIS — J45.20 MILD INTERMITTENT ASTHMA WITHOUT COMPLICATION: ICD-10-CM

## 2025-02-22 DIAGNOSIS — F41.9 ANXIETY: ICD-10-CM

## 2025-02-22 DIAGNOSIS — J30.89 NON-SEASONAL ALLERGIC RHINITIS, UNSPECIFIED TRIGGER: ICD-10-CM

## 2025-02-22 DIAGNOSIS — E78.1 HYPERTRIGLYCERIDEMIA: ICD-10-CM

## 2025-02-22 DIAGNOSIS — I10 BENIGN ESSENTIAL HYPERTENSION: ICD-10-CM

## 2025-02-27 RX ORDER — TRAZODONE HYDROCHLORIDE 50 MG/1
50 TABLET ORAL AT BEDTIME
Qty: 90 TABLET | Refills: 0 | Status: SHIPPED | OUTPATIENT
Start: 2025-02-27

## 2025-02-27 RX ORDER — MONTELUKAST SODIUM 10 MG/1
1 TABLET ORAL AT BEDTIME
Qty: 90 TABLET | Refills: 0 | Status: SHIPPED | OUTPATIENT
Start: 2025-02-27

## 2025-02-27 RX ORDER — LISINOPRIL 40 MG/1
40 TABLET ORAL DAILY
Qty: 90 TABLET | Refills: 0 | Status: SHIPPED | OUTPATIENT
Start: 2025-02-27

## 2025-02-27 RX ORDER — ATORVASTATIN CALCIUM 10 MG/1
10 TABLET, FILM COATED ORAL DAILY
Qty: 90 TABLET | Refills: 0 | Status: SHIPPED | OUTPATIENT
Start: 2025-02-27

## 2025-02-27 NOTE — TELEPHONE ENCOUNTER
[employer] sent Rx request for the following:      Requested Prescriptions   Pending Prescriptions Disp Refills    montelukast (SINGULAIR) 10 MG tablet [Pharmacy Med Name: Montelukast Sodium 10mg Tablet] 90 tablet 3     Sig: Take 1 tablet by mouth at bedtime.       Leukotriene Inhibitors Protocol Failed - 2/27/2025 11:26 AM        Failed - Asthma control assessment score within normal limits in last 6 months     Please review ACT score.           Failed - Medication is active on med list and the sig matches. RN to manually verify dose and sig if red X/fail.     If the protocol passes (green check), you do not need to verify med dose and sig.    A prescription matches if they are the same clinical intention.    For Example: once daily and every morning are the same.    For all fails (red x), verify dose and sig.    If the refill does match what is on file, the RN can still proceed to approve the refill request.     If they do not match, route to the appropriate provider.             Failed - Recent (6 mo) or future (90 days) visit within the authorizing provider's specialty     The patient must have completed an in-person or virtual visit within the past 6 months or has a future visit scheduled within the next 90 days with the authorizing provider s specialty.  Urgent care and e-visits do not quality as an office visit for this protocol.        Last Prescription Date:   3/14/2024  Last Fill Qty/Refills:         90, R-4             lisinopril (ZESTRIL) 40 MG tablet [Pharmacy Med Name: Lisinopril 40mg Tablet] 90 tablet 3     Sig: Take 1 tablet by mouth daily.       ACE Inhibitors (Including Combos) Protocol Failed - 2/27/2025 11:26 AM        Failed - Most recent blood pressure under 140/90 in past 12 months- Clinicial or Patient Reported     BP Readings from Last 3 Encounters:   03/14/24 (!) 144/96   11/15/22 126/72   12/13/21 138/86       No data recorded       Last Prescription Date:   3/14/2024  Last Fill Qty/Refills:          90, R-4             atorvastatin (LIPITOR) 10 MG tablet [Pharmacy Med Name: Atorvastatin Calcium 10mg Tablet] 90 tablet 3     Sig: Take 1 tablet by mouth daily.      Last Prescription Date:   3/14/2024  Last Fill Qty/Refills:         90, R-4             traZODone (DESYREL) 50 MG tablet [Pharmacy Med Name: Trazodone Hydrochloride 50mg Tablet] 90 tablet 3     Sig: Take 1 tablet by mouth at bedtime.       Serotonin Modulators Failed - 2/27/2025 11:26 AM        Failed - DOUGLAS-7 score of less than 5 in past 6 months.     Please review last DOUGLAS-7 score.           Failed - Medication indicated for associated diagnosis     Medication is associated with one or more of the following diagnoses:     Depression   Insomnia          Last Prescription Date:   3/14/2024  Last Fill Qty/Refills:         90, R-4    Last Office Visit:              3/14/2024   Future Office visit:            4/11/2025  Jason Grey RN on 2/27/2025 at 11:36 AM

## 2025-02-28 DIAGNOSIS — F41.9 ANXIETY: ICD-10-CM

## 2025-03-04 RX ORDER — CITALOPRAM HYDROBROMIDE 40 MG/1
40 TABLET ORAL DAILY
Qty: 30 TABLET | Refills: 0 | Status: SHIPPED | OUTPATIENT
Start: 2025-03-04

## 2025-03-04 NOTE — TELEPHONE ENCOUNTER
[facility] - [place], [place] - [address] [phone]  sent Rx request for the following:      Requested Prescriptions   Pending Prescriptions Disp Refills    citalopram (CELEXA) 40 MG tablet [Pharmacy Med Name: Citalopram Hydrobromide 40mg Tablet] 90 tablet 3     Sig: Take 1 tablet by mouth daily.       SSRIs Protocol Failed - 3/4/2025 10:31 AM        Failed - DOUGLAS-7 score of less than 5 in past 6 months.     Please review last DOUGLAS-7 score.        Last Prescription Date:   3/20/24  Last Fill Qty/Refills:         90, R-4    Last Office Visit:              3/14/24 (px)   Future Office visit:            4/11/25    Unable to complete prescription refill per RN Medication Refill Policy.     Walter Pérez RN on 3/4/2025 at 10:32 AM

## 2025-04-18 ENCOUNTER — ORDERS ONLY (AUTO-RELEASED) (OUTPATIENT)
Dept: PEDIATRICS | Facility: OTHER | Age: 55
End: 2025-04-18
Payer: COMMERCIAL

## 2025-04-18 DIAGNOSIS — Z12.11 COLON CANCER SCREENING: ICD-10-CM
